# Patient Record
Sex: FEMALE | Race: BLACK OR AFRICAN AMERICAN | Employment: FULL TIME | URBAN - METROPOLITAN AREA
[De-identification: names, ages, dates, MRNs, and addresses within clinical notes are randomized per-mention and may not be internally consistent; named-entity substitution may affect disease eponyms.]

---

## 2017-06-25 ENCOUNTER — HOSPITAL ENCOUNTER (EMERGENCY)
Age: 37
Discharge: HOME | End: 2017-06-25
Attending: STUDENT IN AN ORGANIZED HEALTH CARE EDUCATION/TRAINING PROGRAM
Payer: COMMERCIAL

## 2017-06-25 VITALS
TEMPERATURE: 98.2 F | DIASTOLIC BLOOD PRESSURE: 104 MMHG | HEART RATE: 89 BPM | OXYGEN SATURATION: 99 % | HEIGHT: 66 IN | RESPIRATION RATE: 18 BRPM | BODY MASS INDEX: 35.03 KG/M2 | SYSTOLIC BLOOD PRESSURE: 158 MMHG | WEIGHT: 218 LBS

## 2017-06-25 DIAGNOSIS — K59.00 CONSTIPATION, UNSPECIFIED CONSTIPATION TYPE: Primary | ICD-10-CM

## 2017-06-25 PROCEDURE — 99283 EMERGENCY DEPT VISIT LOW MDM: CPT

## 2017-06-25 RX ORDER — FACIAL-BODY WIPES
10 EACH TOPICAL DAILY
Qty: 12 SUPPOSITORY | Refills: 0 | Status: SHIPPED | OUTPATIENT
Start: 2017-06-25 | End: 2020-01-22

## 2017-06-25 NOTE — ED TRIAGE NOTES
Patient presents to the emergency department reporting constipation. Patient reports having constipation earlier in the week, using a Fleets and having a stool with that on Wednesday. Patient continues to take Colace daily. Patient took two bottles of Magnesium Citrate and attempted to Fleets enemas tonight without having a stool.

## 2017-06-25 NOTE — DISCHARGE INSTRUCTIONS
We hope that we have addressed all of your medical concerns. The examination and treatment you received in the Emergency Department were for an emergent problem and were not intended as complete care. It is important that you follow up with your healthcare provider(s) for ongoing care. If your symptoms worsen or do not improve as expected, and you are unable to reach your usual health care provider(s), you should return to the Emergency Department. Today's healthcare is undergoing tremendous change, and patient satisfaction surveys are one of the many tools to assess the quality of medical care. You may receive a survey from the [employer] organization regarding your experience in the Emergency Department. I hope that your experience has been completely positive, particularly the medical care that I provided. As such, please participate in the survey; anything less than excellent does not meet my expectations or intentions. On license of [facility] and [facility] participate in nationally recognized quality of care measures. If your blood pressure is greater than 120/80, as reported below, we urge that you seek medical care to address the potential of high blood pressure, commonly known as hypertension. Hypertension can be hereditary or can be caused by certain medical conditions, pain, stress, or \"white coat syndrome. \"       Please make an appointment with your health care provider(s) for follow up of your Emergency Department visit. VITALS:   Patient Vitals for the past 8 hrs:   Temp Pulse Resp BP SpO2   06/25/17 0527 98.2 °F (36.8 °C) 89 18 (!) 158/104 99 %          Thank you for allowing us to provide you with medical care today. We realize that you have many choices for your emergency care needs. Please choose us in the future for any continued health care needs. Yevgeniy Carlson, 16 [facility]. Office: [phone]            No results found for this or any previous visit (from the past 24 hour(s)). No results found. Constipation: Care Instructions  Your Care Instructions  Constipation means that you have a hard time passing stools (bowel movements). People pass stools from 3 times a day to once every 3 days. What is normal for you may be different. Constipation may occur with pain in the rectum and cramping. The pain may get worse when you try to pass stools. Sometimes there are small amounts of bright red blood on toilet paper or the surface of stools. This is because of enlarged veins near the rectum (hemorrhoids). A few changes in your diet and lifestyle may help you avoid ongoing constipation. Your doctor may also prescribe medicine to help loosen your stool. Some medicines can cause constipation. These include pain medicines and antidepressants. Tell your doctor about all the medicines you take. Your doctor may want to make a medicine change to ease your symptoms. Follow-up care is a key part of your treatment and safety. Be sure to make and go to all appointments, and call your doctor if you are having problems. It's also a good idea to know your test results and keep a list of the medicines you take. How can you care for yourself at home? · Drink plenty of fluids, enough so that your urine is light yellow or clear like water. If you have kidney, heart, or liver disease and have to limit fluids, talk with your doctor before you increase the amount of fluids you drink. · Include high-fiber foods in your diet each day. These include fruits, vegetables, beans, and whole grains. · Get at least 30 minutes of exercise on most days of the week. Walking is a good choice. You also may want to do other activities, such as running, swimming, cycling, or playing tennis or team sports. · Take a fiber supplement, such as Citrucel or Metamucil, every day.  Read and follow all instructions on the label. · Schedule time each day for a bowel movement. A daily routine may help. Take your time having your bowel movement. · Support your feet with a small step stool when you sit on the toilet. This helps flex your hips and places your pelvis in a squatting position. · Your doctor may recommend an over-the-counter laxative to relieve your constipation. Examples are Milk of Magnesia and MiraLax. Read and follow all instructions on the label. Do not use laxatives on a long-term basis. When should you call for help? Call your doctor now or seek immediate medical care if:  · You have new or worse belly pain. · You have new or worse nausea or vomiting. · You have blood in your stools. Watch closely for changes in your health, and be sure to contact your doctor if:  · Your constipation is getting worse. · You do not get better as expected. Where can you learn more? Go to http://aldo-karen.info/. Enter 21 [phone] in the search box to learn more about \"Constipation: Care Instructions. \"  Current as of: March 20, 2017  Content Version: 11.3  © 7312-4772 [email or web address]. Care instructions adapted under license by [employer] (which disclaims liability or warranty for this information). If you have questions about a medical condition or this instruction, always ask your healthcare professional. [address] any warranty or liability for your use of this information.

## 2017-06-25 NOTE — ED NOTES
MD reviewed discharge instructions and options with patient; patient verbalized understanding. RN reviewed discharge instructions using teachback method. Pt. Ambulated to exit without difficulty and in no signs of acute distress. Patient was counseled on medications prescribed at discharge.

## 2017-06-25 NOTE — ED PROVIDER NOTES
HPI Comments: Patient is a 48-year-old female with no significant past medical history who presents to the emergency department this morning with a chief complaint of constipation for the past 4 days. Patient states she has been unable to have bowel movement since Wednesday. It is associated with mild lower abdominal cramping. She is recently changed her diet and is eating more fruits and vegetables and has been exercising more, however, this is cause her to have more frequent constipation. She is she used mag citrate, fleets enemas, and Colace at home with only minimal relief. She denies fever, vomiting, back pain, dysuria, rectal pain. Patient states she is suffered from constipation off and on over the past several years, but it has not been this bad since the birth of her first child. Patient has an IUD and does not get regular periods. The history is provided by the patient. No  was used. 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  2006    HX GYN      left fallopian tube removal    HX OTHER SURGICAL  2002    lap for ectopic, L fallop.  tube removed    HX OTHER SURGICAL  2014    oral surgery, teeth extracted    HX OTHER SURGICAL  2014    surgery under L arm    HX OTHER SURGICAL  2013    L knee surgery, torn meniscus    HX OTHER SURGICAL  1984    T&A         Family History:   Problem Relation Age of Onset    Cancer Mother     Arthritis-osteo Mother     Diabetes Mother     Hypertension Mother     Cancer Father     Heart Failure Father     Arthritis-osteo Maternal Grandmother     Cancer Maternal Grandmother     Elevated Lipids Maternal Grandmother     Hypertension Maternal Grandmother     Stroke Maternal Grandmother     Arthritis-osteo Maternal Grandfather     Cancer Maternal Grandfather     No Known Problems Sister        Social History     Social History    Marital status:      Spouse name: N/A    Number of children: N/A    Years of education: N/A     Occupational History    Not on file. Social History Main Topics    Smoking status: Never Smoker    Smokeless tobacco: Never Used    Alcohol use No      Comment: social    Drug use: No    Sexual activity: No     Other Topics Concern    Not on file     Social History Narrative         ALLERGIES: Review of patient's allergies indicates no known allergies. Review of Systems   Constitutional: Negative for chills and fever. HENT: Negative for sore throat. Respiratory: Negative for cough and shortness of breath. Cardiovascular: Negative for chest pain. Gastrointestinal: Positive for constipation. Negative for abdominal distention, abdominal pain, anal bleeding, nausea, rectal pain and vomiting. Genitourinary: Negative for dysuria. Musculoskeletal: Negative for back pain. Skin: Negative for rash. Neurological: Negative for syncope and headaches. Psychiatric/Behavioral: Negative for confusion. All other systems reviewed and are negative. Vitals:    06/25/17 0527   BP: (!) 158/104   Pulse: 89   Resp: 18   Temp: 98.2 °F (36.8 °C)   SpO2: 99%   Weight: 98.9 kg (218 lb)   Height: 5' 6\" (1.676 m)            Physical Exam   Constitutional: She is oriented to person, place, and time. She appears well-developed. No distress. HENT:   Head: Normocephalic and atraumatic. Eyes: Conjunctivae and EOM are normal. Pupils are equal, round, and reactive to light. Neck: Normal range of motion. Neck supple. Cardiovascular: Normal rate, regular rhythm and normal heart sounds. No murmur heard. Pulmonary/Chest: Effort normal and breath sounds normal. No respiratory distress. Abdominal: Soft.  Bowel sounds are normal. She exhibits no distension. There is no tenderness. There is no rebound and no guarding. Musculoskeletal: Normal range of motion. She exhibits no edema. Neurological: She is alert and oriented to person, place, and time. No cranial nerve deficit. She exhibits normal muscle tone. Coordination normal.   Skin: Skin is warm and dry. No rash noted. Psychiatric: She has a normal mood and affect. Her behavior is normal.   Nursing note and vitals reviewed.        [facility]  ED Course       Procedures

## 2017-12-20 ENCOUNTER — HOSPITAL ENCOUNTER (EMERGENCY)
Age: 37
Discharge: HOME | End: 2017-12-20
Attending: EMERGENCY MEDICINE
Payer: COMMERCIAL

## 2017-12-20 ENCOUNTER — APPOINTMENT (OUTPATIENT)
Dept: GENERAL RADIOLOGY | Age: 37
End: 2017-12-20
Attending: EMERGENCY MEDICINE
Payer: COMMERCIAL

## 2017-12-20 VITALS
SYSTOLIC BLOOD PRESSURE: 122 MMHG | RESPIRATION RATE: 16 BRPM | HEART RATE: 101 BPM | BODY MASS INDEX: 35.1 KG/M2 | HEIGHT: 66 IN | TEMPERATURE: 99.8 F | WEIGHT: 218.38 LBS | DIASTOLIC BLOOD PRESSURE: 84 MMHG | OXYGEN SATURATION: 97 %

## 2017-12-20 DIAGNOSIS — R68.89 FLU-LIKE SYMPTOMS: Primary | ICD-10-CM

## 2017-12-20 LAB
FLUAV AG NPH QL IA: NEGATIVE
FLUBV AG NOSE QL IA: NEGATIVE
HCG UR QL: NEGATIVE

## 2017-12-20 PROCEDURE — 77030029684 HC NEB SM VOL KT MONA -A

## 2017-12-20 PROCEDURE — 96361 HYDRATE IV INFUSION ADD-ON: CPT

## 2017-12-20 PROCEDURE — 74011250636 HC RX REV CODE- 250/636: Performed by: EMERGENCY MEDICINE

## 2017-12-20 PROCEDURE — 94664 DEMO&/EVAL PT USE INHALER: CPT

## 2017-12-20 PROCEDURE — 99284 EMERGENCY DEPT VISIT MOD MDM: CPT

## 2017-12-20 PROCEDURE — 81025 URINE PREGNANCY TEST: CPT

## 2017-12-20 PROCEDURE — 96374 THER/PROPH/DIAG INJ IV PUSH: CPT

## 2017-12-20 PROCEDURE — 94640 AIRWAY INHALATION TREATMENT: CPT

## 2017-12-20 PROCEDURE — 87804 INFLUENZA ASSAY W/OPTIC: CPT | Performed by: EMERGENCY MEDICINE

## 2017-12-20 PROCEDURE — 74011250637 HC RX REV CODE- 250/637: Performed by: EMERGENCY MEDICINE

## 2017-12-20 PROCEDURE — 71020 XR CHEST PA LAT: CPT

## 2017-12-20 RX ORDER — KETOROLAC TROMETHAMINE 30 MG/ML
30 INJECTION, SOLUTION INTRAMUSCULAR; INTRAVENOUS
Status: COMPLETED | OUTPATIENT
Start: 2017-12-20 | End: 2017-12-20

## 2017-12-20 RX ORDER — PREDNISONE 20 MG/1
40 TABLET ORAL DAILY
Qty: 10 TAB | Refills: 0 | Status: SHIPPED | OUTPATIENT
Start: 2017-12-20 | End: 2017-12-25

## 2017-12-20 RX ORDER — GUAIFENESIN 600 MG/1
600 TABLET, EXTENDED RELEASE ORAL
Status: COMPLETED | OUTPATIENT
Start: 2017-12-20 | End: 2017-12-20

## 2017-12-20 RX ORDER — IBUPROFEN 600 MG/1
600 TABLET ORAL
Qty: 20 TAB | Refills: 0 | Status: SHIPPED | OUTPATIENT
Start: 2017-12-20 | End: 2020-01-22

## 2017-12-20 RX ORDER — GUAIFENESIN 600 MG/1
600 TABLET, EXTENDED RELEASE ORAL 2 TIMES DAILY
Qty: 20 TAB | Refills: 0 | Status: SHIPPED | OUTPATIENT
Start: 2017-12-20 | End: 2020-01-22

## 2017-12-20 RX ORDER — ALBUTEROL SULFATE 90 UG/1
2 AEROSOL, METERED RESPIRATORY (INHALATION)
Status: DISCONTINUED | OUTPATIENT
Start: 2017-12-20 | End: 2017-12-20 | Stop reason: HOSPADM

## 2017-12-20 RX ADMIN — SODIUM CHLORIDE 1000 ML: 900 INJECTION, SOLUTION INTRAVENOUS at 14:18

## 2017-12-20 RX ADMIN — KETOROLAC TROMETHAMINE 30 MG: 30 INJECTION, SOLUTION INTRAMUSCULAR at 14:18

## 2017-12-20 RX ADMIN — ALBUTEROL SULFATE 2 PUFF: 90 AEROSOL, METERED RESPIRATORY (INHALATION) at 15:17

## 2017-12-20 RX ADMIN — GUAIFENESIN 600 MG: 600 TABLET, EXTENDED RELEASE ORAL at 14:40

## 2017-12-20 NOTE — DISCHARGE INSTRUCTIONS
We hope that we have addressed all of your medical concerns. The examination and treatment you received in the Emergency Department were for an emergent problem and were not intended as complete care. It is important that you follow up with your healthcare provider(s) for ongoing care. If your symptoms worsen or do not improve as expected, and you are unable to reach your usual health care provider(s), you should return to the Emergency Department. Today's healthcare is undergoing tremendous change, and patient satisfaction surveys are one of the many tools to assess the quality of medical care. You may receive a survey from the [employer] regarding your experience in the Emergency Department. I hope that your experience has been completely positive, particularly the medical care that I provided. As such, please participate in the survey; anything less than excellent does not meet my expectations or intentions. [facility] and [address] [place] [address] participate in nationally recognized quality of care measures. If your blood pressure is greater than 120/80, as reported below, we urge that you seek medical care to address the potential of high blood pressure, commonly known as hypertension. Hypertension can be hereditary or can be caused by certain medical conditions, pain, stress, or \"white coat syndrome. \"       Please make an appointment with your health care provider(s) for follow up of your Emergency Department visit. VITALS:   Patient Vitals for the past 8 hrs:   Temp Pulse Resp BP SpO2   12/20/17 1355 (!) 101.4 °F (38.6 °C) (!) 117 18 133/90 96 %          Thank you for allowing us to provide you with medical care today. We realize that you have many choices for your emergency care needs. Please choose us in the future for any continued health care needs.       Prasad Draper MD    [facility], 5 [facility]. Office: [phone]            Recent Results (from the past 24 hour(s))   INFLUENZA A & B AG (RAPID TEST)    Collection Time: 12/20/17  2:03 PM   Result Value Ref Range    Influenza A Antigen NEGATIVE  NEG      Influenza B Antigen NEGATIVE  NEG     HCG URINE, QL. - POC    Collection Time: 12/20/17  2:47 PM   Result Value Ref Range    Pregnancy test,urine (POC) NEGATIVE  NEG         No results found.

## 2017-12-20 NOTE — ED TRIAGE NOTES
Pt states that she was started on a z-pack Monday for bronchitis, pt states \"i think I have the flu, cant keep my temp down and I have the chills\". Pt is coughing in triage. Pt states that the symptoms started Sunday.

## 2017-12-20 NOTE — LETTER
[address] 
[address] [facility] [id number] 
[phone] Work/School Note Date: 12/20/2017 To Whom It May concern: 
 
Nelda Alicea was seen and treated today in the emergency room by the following provider(s): 
Attending Provider: Jodie Hurst MD.   
 
Nelda Alicea may return to work on 12/26/2017.  
 
Sincerely, 
 
 
 
 
Jodie Hurst MD

## 2017-12-20 NOTE — ED PROVIDER NOTES
HPI Comments: 39 y.o. female with past medical history significant for ectopic pregnancy, epilepsy, anemia, and C section who presents from home with chief complaint of myalgias. The pt reports that she started to feel weak 3 days ago. 2 days ago, she started to have chills, fever,  myalgias, sore throat, and rhinorrhea. The pt went to a clinic, had a negative flu swab, was dx with bronchitis and was started on a Z-Pack. The has also been taking tylenol and motrin as well. The pt denies vomiting and diarrhea. There are no other acute medical concerns at this time. Social hx: nonsmoker, no EtOH use  PCP: PROVIDER UNKNOWN    Note written by Fulton Hodgkin, Scribe, as dictated by Deangelo Simms MD 2:18 PM      The history is provided by the patient. No  was used. 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  tube removed    HX OTHER SURGICAL  2014    oral surgery, teeth extracted    HX OTHER SURGICAL  2014    surgery under L arm    HX OTHER SURGICAL  2013    L knee surgery, torn meniscus    HX OTHER SURGICAL  1984    T&A         Family History:   Problem Relation Age of Onset    Cancer Mother     Arthritis-osteo Mother     Diabetes Mother     Hypertension Mother     Cancer Father     Heart Failure Father     Arthritis-osteo Maternal Grandmother     Cancer Maternal Grandmother     Elevated Lipids Maternal Grandmother     Hypertension Maternal Grandmother     Stroke Maternal Grandmother     Arthritis-osteo Maternal Grandfather     Cancer Maternal Grandfather     No Known Problems Sister Social History     Social History    Marital status:      Spouse name: N/A    Number of children: N/A    Years of education: N/A     Occupational History    Not on file. Social History Main Topics    Smoking status: Never Smoker    Smokeless tobacco: Never Used    Alcohol use No      Comment: social    Drug use: No    Sexual activity: No     Other Topics Concern    Not on file     Social History Narrative         ALLERGIES: Review of patient's allergies indicates no known allergies. Review of Systems   Constitutional: Positive for chills and fever. HENT: Positive for rhinorrhea and sore throat. Negative for facial swelling. Eyes: Negative for visual disturbance. Respiratory: Negative for chest tightness. Cardiovascular: Negative for chest pain. Gastrointestinal: Negative for abdominal pain, diarrhea, nausea and vomiting. Genitourinary: Negative for dysuria. Musculoskeletal: Positive for myalgias. Negative for arthralgias. Skin: Negative for rash. Neurological: Positive for weakness. Negative for dizziness. Hematological: Negative for adenopathy. Psychiatric/Behavioral: Negative for suicidal ideas. All other systems reviewed and are negative. Vitals:    12/20/17 1355   BP: 133/90   Pulse: (!) 117   Resp: 18   Temp: (!) 101.4 °F (38.6 °C)   SpO2: 96%   Weight: 99.1 kg (218 lb 6 oz)   Height: 5' 6\" (1.676 m)            Physical Exam   Constitutional: She is oriented to person, place, and time. She appears well-developed and well-nourished. No distress. HENT:   Head: Normocephalic and atraumatic. Mouth/Throat: Oropharynx is clear and moist.   Eyes: Pupils are equal, round, and reactive to light. No scleral icterus. Neck: Normal range of motion. Neck supple. No thyromegaly present. Cardiovascular: Regular rhythm, normal heart sounds and intact distal pulses. No murmur heard.   Tachycardic;     Pulmonary/Chest: Effort normal and breath sounds normal. No respiratory distress. Abdominal: Soft. Bowel sounds are normal. She exhibits no distension. There is no tenderness. Musculoskeletal: Normal range of motion. She exhibits no edema. Neurological: She is alert and oriented to person, place, and time. Skin: Skin is warm and dry. No rash noted. She is not diaphoretic. Nursing note and vitals reviewed. Note written by Argenis Cameron, as dictated by Urmila Ortiz MD 2:19 PM    [facility]  ED Course       Procedures    SUBJECTIVE:   Malinda Alfonso is a 39 y.o. female who present complaining of flu-like symptoms: fevers, chills, myalgias, congestion, sore throat and cough for 3 days. Denies dyspnea or wheezing. OBJECTIVE:  Appears moderately ill but not toxic; temperature as noted in vitals. Ears normal. Throat and pharynx normal.  Neck supple. No adenopathy in the neck. Sinuses non tender. The chest is clear. Rapid flu neg. CXR unremarkable. ASSESSMENT:  Influenza or virla illness. PLAN:  Symptomatic therapy suggested: rest, increase fluids, albuterol and prednisone for bronchiole cough. Call prn if symptoms persist or worsen. Call or return to clinic prn if these symptoms worsen or fail to improve as anticipated.

## 2020-01-22 ENCOUNTER — OFFICE VISIT (OUTPATIENT)
Dept: FAMILY MEDICINE CLINIC | Age: 40
End: 2020-01-22

## 2020-01-22 VITALS
TEMPERATURE: 98.3 F | RESPIRATION RATE: 17 BRPM | HEIGHT: 66 IN | DIASTOLIC BLOOD PRESSURE: 91 MMHG | OXYGEN SATURATION: 97 % | HEART RATE: 89 BPM | WEIGHT: 219 LBS | SYSTOLIC BLOOD PRESSURE: 142 MMHG | BODY MASS INDEX: 35.2 KG/M2

## 2020-01-22 DIAGNOSIS — G62.9 NEUROPATHY: ICD-10-CM

## 2020-01-22 DIAGNOSIS — Z13.220 SCREENING FOR HYPERLIPIDEMIA: ICD-10-CM

## 2020-01-22 DIAGNOSIS — R53.82 CHRONIC FATIGUE: ICD-10-CM

## 2020-01-22 DIAGNOSIS — R06.83 SNORING: ICD-10-CM

## 2020-01-22 DIAGNOSIS — I10 ESSENTIAL HYPERTENSION: Primary | ICD-10-CM

## 2020-01-22 DIAGNOSIS — E66.01 SEVERE OBESITY (HCC): ICD-10-CM

## 2020-01-22 DIAGNOSIS — M79.604 RIGHT LEG PAIN: ICD-10-CM

## 2020-01-22 RX ORDER — AMLODIPINE BESYLATE 5 MG/1
TABLET ORAL
COMMUNITY
End: 2020-01-23 | Stop reason: SDUPTHER

## 2020-01-22 NOTE — PROGRESS NOTES
Chief Complaint   Patient presents with   [facility]    Leg Pain     right      1. Have you been to the ER, urgent care clinic since your last visit? Hospitalized since your last visit? No    2. Have you seen or consulted any other health care providers outside of the [address] [place] [address] since your last visit? Include any pap smears or colon screening.  No

## 2020-01-22 NOTE — PROGRESS NOTES
Tegan Hoffmanveronica Rod  [address] y.o. female  1980  [address]  [id number]     [facility]       Encounter Date: 1/22/2020           New Patient Visit Note: Aimee Ramírez MD    Previous PCP: Urgent Care    Reason for Appointment:  Chief Complaint   Patient presents with   [place] [facility]    Leg Pain     right        History of Present Illness:  History provided by patient    Karina Landon is a [address] y.[address] 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 Denies any chronic neck pain. Does endorse chronic low back pain.          Chronic pelvic pain in female     Followed by obgyn      BV (bacterial vaginosis)     Patient reports having chronic BV  She is followed by OBGYN      Hydradenitis     Followed by Surgery (Dr. Brittney Arguello)      Essential hypertension     Duration: since Jan, 2017  Inciting Factors; reports that her blood pressure has been elevated ever since haivng her baby  Medicines: Norvasc, 5mg daily  Diet: adhering to a low salt diet (avoid beef, pork)  Exercise: stays active during the day with work; goes to the gym occasionally  Snoring: yes, never evaluated for PIERRE  Tb: no      Severe obesity ([address]     Patient reports that she has tried a number of diets in the past, but she reports that it is difficulty because she is usually very busy. She does reports that she stays active at work. Has Children ages 22,13,3      Other Conditions  Has chronic pelvic pain and BV followed by OBGYN  Has HS (followed by surgery- Mercedes)  Hx of anxeity (no recent attacks)      Review of Systems  Denies any dyspnea or CP. All other ROS were reviewed and are negative except as discussed in HPI    Allergies: Patient has no known allergies. Medications: (Updated to reflect final medication list after visit)    Current Outpatient Medications:     amLODIPine (NORVASC) 5 mg tablet, Take 2 Tabs by mouth daily. , Disp: 1 Tab, Rfl: 0    B-complex with vitamin C (SUPER B COMPLEX-VITAMIN C PO), Super B Complex-Vitamin C, Disp: , Rfl:     multivit-min-ferrous fumarate (MULTI VITAMIN) 9 mg iron/15 mL liqd, Multi Vitamin, Disp: , Rfl:     levonorgestreL (MIRENA) 20 mcg/24 hours (5 yrs) 52 mg IUD, Mirena 20 mcg/24 hours (5 yrs) 52 mg intrauterine device  Take by intrauterine route., Disp: , Rfl:     History  Patient Care Team:  Florence Herrera MD as PCP - General (Family Practice)  Florence Herrera MD as PCP - [facility] Provider  Bimal Saldaña MD as Physician (Internal Medicine)    Past Medical History: she has a past medical history of Anemia (), Diet controlled gestational diabetes mellitus (GDM) in third trimester (2016), Ectopic pregnancy, Epilepsy ([address] and Gestational diabetes (2016). Past Surgical History: she has a past surgical history that includes hx gyn; pr  delivery only (); pr  delivery only (); hx other surgical (); hx other surgical (); hx other surgical (); hx other surgical (); and hx other surgical ().     Family Medical History: family history includes Arthritis-osteo in her maternal grandfather, maternal grandmother, and mother; Cancer in her father, maternal grandfather, and maternal grandmother; Diabetes in her mother; Elevated Lipids in her maternal grandmother; Heart Failure in her father; Hypertension in her maternal grandmother and mother; No Known Problems in her sister; Stroke in her maternal grandmother. Social History: she reports that she has never smoked. She has never used smokeless tobacco. She reports current alcohol use. She reports that she does not use drugs. Health Maintenance Due   Topic Date Due    DTaP/Tdap/Td series (1 - Tdap) 12/29/1990    PAP AKA CERVICAL CYTOLOGY  12/29/2000    Influenza Age 9 to Adult  08/01/2019       Objective:   Visit Vitals  BP (!) 142/91   Pulse 89   Temp 98.3 °F (36.8 °C) (Oral)   Resp 17   Ht 5' 6\" (1.676 m)   Wt 219 lb (99.3 kg)   SpO2 97%   BMI 35.35 kg/m²     Wt Readings from Last 3 Encounters:   01/22/20 219 lb (99.3 kg)   12/20/17 218 lb 6 oz (99.1 kg)   06/25/17 218 lb (98.9 kg)       Physical Exam  Vitals signs reviewed. HENT:      Head: Normocephalic and atraumatic. Right Ear: Hearing normal. No drainage. No middle ear effusion. There is no impacted cerumen. Tympanic membrane is not injected or erythematous. Left Ear: Hearing normal. No drainage. No middle ear effusion. There is no impacted cerumen. Tympanic membrane is not injected or erythematous. Nose: Nose normal. No nasal deformity or septal deviation. Mouth/Throat:      Lips: Pink. No lesions. Mouth: Mucous membranes are moist.      Palate: No mass. Pharynx: Oropharynx is clear. Uvula midline. No pharyngeal swelling, oropharyngeal exudate or posterior oropharyngeal erythema. Tonsils: No tonsillar exudate. Eyes:      General: Lids are normal. Vision grossly intact. Gaze aligned appropriately. Extraocular Movements: Extraocular movements intact. Conjunctiva/sclera:      Right eye: Right conjunctiva is not injected. Left eye: Left conjunctiva is not injected. Pupils: Pupils are equal, round, and reactive to light. Neck:      Musculoskeletal: Normal range of motion and neck supple. No neck rigidity or muscular tenderness. Vascular: No carotid bruit. Cardiovascular:      Rate and Rhythm: Normal rate and regular rhythm. Heart sounds: No murmur. No friction rub. No gallop. Pulmonary:      Effort: No respiratory distress. Breath sounds: No wheezing, rhonchi or rales. Abdominal:      General: Bowel sounds are normal. There is no distension or abdominal bruit. Tenderness: There is no tenderness. Musculoskeletal: Normal range of motion. Cervical back: Normal.      Comments: Lower Extremity Circumference  40.5 at 30cm (left leg)  41.0 cm at 30cm (right leg)   Lymphadenopathy:      Cervical: No cervical adenopathy. Neurological:      General: No focal deficit present. Motor: Motor function is intact. Coordination: Coordination is intact. Gait: Gait is intact. Deep Tendon Reflexes:      Reflex Scores:       Patellar reflexes are 2+ on the right side and 2+ on the left side. Psychiatric:         Attention and Perception: Attention and perception normal.         Mood and Affect: Mood and affect normal.         Speech: Speech normal.         Cognition and Memory: Cognition and memory normal.          Assessment & Plan:    Diagnoses and all orders for this visit:    1. Essential hypertension  Assessment & Plan:  Chronic, BP elevated today  - increase Norvasc from 5mg to 10mg daily  - continue to monitor BP at home  - RTC 2 weeks for BP check    Orders:  -     amLODIPine (NORVASC) 5 mg tablet; Take 2 Tabs by mouth daily. 2. Severe obesity ([address]  Assessment & Plan:  Chronic: I have reviewed/discussed the above normal BMI with the patient.   I have recommended the following interventions: dietary management education, guidance, and counseling, encourage exercise, monitor weight and prescribed dietary intake. 3. Snoring  Assessment & Plan:  Chronic  - will evaluate for sleep apnea with sleep study    Orders:  -     REFERRAL TO SLEEP STUDIES    4. Right leg pain  Assessment & Plan:  Chronic, unclear etiology, no significant swelling on exam today but given patient's expressed concern for DVT will order ultrasound to evaluate further  - obtain US of right LE  - discussed red flag symptoms and reasons to call or go to ED    Orders:  -     DUPLEX LOWER EXT VENOUS RIGHT; Future    5. Neuropathy  Assessment & Plan:  Chronic, unclear etiology for her symptoms  - obtain lumbar xray given hx of neuropathy in setting of chronic low back pain  - consider referral to neurology if xray unrevealing     Orders:  -     VITAMIN B12 & FOLATE    6. Chronic fatigue  -     TSH 3RD GENERATION  -     CBC WITH AUTOMATED DIFF  -     VITAMIN D, 25 HYDROXY    7. Screening for hyperlipidemia  -     METABOLIC PANEL, COMPREHENSIVE  -     LIPID PANEL      I have discussed the diagnosis with the patient and the intended plan as seen in the above orders. The patient has received an after-visit summary along with patient information handout. I have discussed medication side effects and warnings with the patient as well. Dispostion  Follow-up and Dispositions    · Return in about 2 weeks (around 2/5/2020) for neuropathy.            Inderjit Hutchinson MD

## 2020-01-22 NOTE — PATIENT INSTRUCTIONS
DASH Diet: Care Instructions Your Care Instructions The DASH diet is an eating plan that can help lower your blood pressure. DASH stands for Dietary Approaches to Stop Hypertension. Hypertension is high blood pressure. The DASH diet focuses on eating foods that are high in calcium, potassium, and magnesium. These nutrients can lower blood pressure. The foods that are highest in these nutrients are fruits, vegetables, low-fat dairy products, nuts, seeds, and legumes. But taking calcium, potassium, and magnesium supplements instead of eating foods that are high in those nutrients does not have the same effect. The DASH diet also includes whole grains, fish, and poultry. The DASH diet is one of several lifestyle changes your doctor may recommend to lower your high blood pressure. Your doctor may also want you to decrease the amount of sodium in your diet. Lowering sodium while following the DASH diet can lower blood pressure even further than just the DASH diet alone. Follow-up care is a key part of your treatment and safety. Be sure to make and go to all appointments, and call your doctor if you are having problems. It's also a good idea to know your test results and keep a list of the medicines you take. How can you care for yourself at home? Following the DASH diet · Eat 4 to 5 servings of fruit each day. A serving is 1 medium-sized piece of fruit, ½ cup chopped or canned fruit, 1/4 cup dried fruit, or 4 ounces (½ cup) of fruit juice. Choose fruit more often than fruit juice. · Eat 4 to 5 servings of vegetables each day. A serving is 1 cup of lettuce or raw leafy vegetables, ½ cup of chopped or cooked vegetables, or 4 ounces (½ cup) of vegetable juice. Choose vegetables more often than vegetable juice. · Get 2 to 3 servings of low-fat and fat-free dairy each day. A serving is 8 ounces of milk, 1 cup of yogurt, or 1 ½ ounces of cheese. · Eat 6 to 8 servings of grains each day. A serving is 1 slice of bread, 1 ounce of dry cereal, or ½ cup of cooked rice, pasta, or cooked cereal. Try to choose whole-grain products as much as possible. · Limit lean meat, poultry, and fish to 2 servings each day. A serving is 3 ounces, about the size of a deck of cards. · Eat 4 to 5 servings of nuts, seeds, and legumes (cooked dried beans, lentils, and split peas) each week. A serving is 1/3 cup of nuts, 2 tablespoons of seeds, or ½ cup of cooked beans or peas. · Limit fats and oils to 2 to 3 servings each day. A serving is 1 teaspoon of vegetable oil or 2 tablespoons of salad dressing. · Limit sweets and added sugars to 5 servings or less a week. A serving is 1 tablespoon jelly or jam, ½ cup sorbet, or 1 cup of lemonade. · Eat less than 2,300 milligrams (mg) of sodium a day. If you limit your sodium to 1,500 mg a day, you can lower your blood pressure even more. Tips for success · Start small. Do not try to make dramatic changes to your diet all at once. You might feel that you are missing out on your favorite foods and then be more likely to not follow the plan. Make small changes, and stick with them. Once those changes become habit, add a few more changes. · Try some of the following: ? Make it a goal to eat a fruit or vegetable at every meal and at snacks. This will make it easy to get the recommended amount of fruits and vegetables each day. ? Try yogurt topped with fruit and nuts for a snack or healthy dessert. ? Add lettuce, tomato, cucumber, and onion to sandwiches. ? Combine a ready-made pizza crust with low-fat mozzarella cheese and lots of vegetable toppings. Try using tomatoes, squash, spinach, broccoli, carrots, cauliflower, and onions. ? Have a variety of cut-up vegetables with a low-fat dip as an appetizer instead of chips and dip. ? Sprinkle sunflower seeds or chopped almonds over salads.  Or try adding chopped walnuts or almonds to cooked vegetables. ? Try some vegetarian meals using beans and peas. Add garbanzo or kidney beans to salads. Make burritos and tacos with mashed randall beans or black beans. Where can you learn more? Go to http://aldo-karen.info/. Enter T471 in the search box to learn more about \"DASH Diet: Care Instructions. \" Current as of: April 9, 2019 Content Version: 12.2 © 9836-8690 [employer]. Care instructions adapted under license by [employer] (which disclaims liability or warranty for this information). If you have questions about a medical condition or this instruction, always ask your healthcare professional. [address] any warranty or liability for your use of this information.

## 2020-01-23 PROBLEM — I10 ESSENTIAL HYPERTENSION: Status: ACTIVE | Noted: 2020-01-23

## 2020-01-23 PROBLEM — R10.2 CHRONIC PELVIC PAIN IN FEMALE: Status: ACTIVE | Noted: 2020-01-23

## 2020-01-23 PROBLEM — R06.83 SNORING: Status: ACTIVE | Noted: 2020-01-23

## 2020-01-23 PROBLEM — B96.89 BV (BACTERIAL VAGINOSIS): Chronic | Status: ACTIVE | Noted: 2020-01-23

## 2020-01-23 PROBLEM — F41.9 ANXIETY: Status: ACTIVE | Noted: 2020-01-23

## 2020-01-23 PROBLEM — L73.2 HYDRADENITIS: Status: ACTIVE | Noted: 2020-01-23

## 2020-01-23 PROBLEM — G89.29 CHRONIC PELVIC PAIN IN FEMALE: Status: ACTIVE | Noted: 2020-01-23

## 2020-01-23 PROBLEM — G62.9 NEUROPATHY: Status: ACTIVE | Noted: 2020-01-23

## 2020-01-23 PROBLEM — M79.604 RIGHT LEG PAIN: Status: ACTIVE | Noted: 2020-01-23

## 2020-01-23 PROBLEM — N76.0 BV (BACTERIAL VAGINOSIS): Chronic | Status: ACTIVE | Noted: 2020-01-23

## 2020-01-23 RX ORDER — AMLODIPINE BESYLATE 5 MG/1
10 TABLET ORAL DAILY
Qty: 1 TAB | Refills: 0
Start: 2020-01-23 | End: 2021-10-05

## 2020-01-24 LAB
25(OH)D3+25(OH)D2 SERPL-MCNC: 8.8 NG/ML (ref 30–100)
ALBUMIN SERPL-MCNC: 4.4 G/DL (ref 3.8–4.8)
ALBUMIN/GLOB SERPL: 1.3 {RATIO} (ref 1.2–2.2)
ALP SERPL-CCNC: 93 IU/L (ref 39–117)
ALT SERPL-CCNC: 9 IU/L (ref 0–32)
AST SERPL-CCNC: 12 IU/L (ref 0–40)
BASOPHILS # BLD AUTO: 0 X10E3/UL (ref 0–0.2)
BASOPHILS NFR BLD AUTO: 1 %
BILIRUB SERPL-MCNC: 0.2 MG/DL (ref 0–1.2)
BUN SERPL-MCNC: 17 MG/DL (ref 6–24)
BUN/CREAT SERPL: 21 (ref 9–23)
CALCIUM SERPL-MCNC: 9.5 MG/DL (ref 8.7–10.2)
CHLORIDE SERPL-SCNC: 103 MMOL/L (ref 96–106)
CHOLEST SERPL-MCNC: 208 MG/DL (ref 100–199)
CO2 SERPL-SCNC: 22 MMOL/L (ref 20–29)
CREAT SERPL-MCNC: 0.81 MG/DL (ref 0.57–1)
EOSINOPHIL # BLD AUTO: 0.2 X10E3/UL (ref 0–0.4)
EOSINOPHIL NFR BLD AUTO: 3 %
ERYTHROCYTE [DISTWIDTH] IN BLOOD BY AUTOMATED COUNT: 12.8 % (ref 11.7–15.4)
FOLATE SERPL-MCNC: 8.8 NG/ML
GLOBULIN SER CALC-MCNC: 3.4 G/DL (ref 1.5–4.5)
GLUCOSE SERPL-MCNC: 101 MG/DL (ref 65–99)
HCT VFR BLD AUTO: 38.9 % (ref 34–46.6)
HDLC SERPL-MCNC: 50 MG/DL
HGB BLD-MCNC: 13.3 G/DL (ref 11.1–15.9)
IMM GRANULOCYTES # BLD AUTO: 0 X10E3/UL (ref 0–0.1)
IMM GRANULOCYTES NFR BLD AUTO: 0 %
INTERPRETATION, 910389: NORMAL
LDLC SERPL CALC-MCNC: 119 MG/DL (ref 0–99)
LYMPHOCYTES # BLD AUTO: 3 X10E3/UL (ref 0.7–3.1)
LYMPHOCYTES NFR BLD AUTO: 40 %
MCH RBC QN AUTO: 28.9 PG (ref 26.6–33)
MCHC RBC AUTO-ENTMCNC: 34.2 G/DL (ref 31.5–35.7)
MCV RBC AUTO: 85 FL (ref 79–97)
MONOCYTES # BLD AUTO: 0.6 X10E3/UL (ref 0.1–0.9)
MONOCYTES NFR BLD AUTO: 8 %
NEUTROPHILS # BLD AUTO: 3.8 X10E3/UL (ref 1.4–7)
NEUTROPHILS NFR BLD AUTO: 48 %
PLATELET # BLD AUTO: 187 X10E3/UL (ref 150–450)
POTASSIUM SERPL-SCNC: 4.3 MMOL/L (ref 3.5–5.2)
PROT SERPL-MCNC: 7.8 G/DL (ref 6–8.5)
RBC # BLD AUTO: 4.6 X10E6/UL (ref 3.77–5.28)
SODIUM SERPL-SCNC: 143 MMOL/L (ref 134–144)
TRIGL SERPL-MCNC: 193 MG/DL (ref 0–149)
TSH SERPL DL<=0.005 MIU/L-ACNC: 0.9 UIU/ML (ref 0.45–4.5)
VIT B12 SERPL-MCNC: 378 PG/ML (ref 232–1245)
VLDLC SERPL CALC-MCNC: 39 MG/DL (ref 5–40)
WBC # BLD AUTO: 7.7 X10E3/UL (ref 3.4–10.8)

## 2020-01-24 NOTE — ASSESSMENT & PLAN NOTE
Chronic, BP elevated today  - increase Norvasc from 5mg to 10mg daily  - continue to monitor BP at home  - RTC 2 weeks for BP check

## 2020-01-24 NOTE — ASSESSMENT & PLAN NOTE
Chronic, unclear etiology for her symptoms  - obtain lumbar xray given hx of neuropathy in setting of chronic low back pain  - consider referral to neurology if xray unrevealing

## 2020-01-24 NOTE — ASSESSMENT & PLAN NOTE
Chronic, unclear etiology, no significant swelling on exam today but given patient's expressed concern for DVT will order ultrasound to evaluate further  - obtain US of right LE  - discussed red flag symptoms and reasons to call or go to ED

## 2020-01-24 NOTE — ASSESSMENT & PLAN NOTE
Chronic: I have reviewed/discussed the above normal BMI with the patient. I have recommended the following interventions: dietary management education, guidance, and counseling, encourage exercise, monitor weight and prescribed dietary intake.

## 2020-02-05 ENCOUNTER — TELEPHONE (OUTPATIENT)
Dept: FAMILY MEDICINE CLINIC | Age: 40
End: 2020-02-05

## 2020-02-05 DIAGNOSIS — E55.9 VITAMIN D DEFICIENCY: Primary | ICD-10-CM

## 2020-02-05 RX ORDER — ERGOCALCIFEROL 1.25 MG/1
50000 CAPSULE ORAL
Qty: 12 CAP | Refills: 0 | Status: SHIPPED | OUTPATIENT
Start: 2020-02-05 | End: 2021-10-05

## 2020-02-05 NOTE — PROGRESS NOTES
Attempted to call patient regarding recent lab results. No answer. Left VM. Please call patient to let her know that her Vitamin d is low. I have sent a supplement for this to her pharmacy, and we will plan to recheck her labs in 3 months. Her cholesterol and LDL. No medications are recommended at this time, but I recommend a diet rich in healthy fruits and vegetables as well as regular exercise. The remainder of her labs are stable.

## 2020-02-12 NOTE — PROGRESS NOTES
Outbound call to patient. Patients date of birth verified. Patient made aware of lab results and recommendations per Dr. Harsh Uriarte. Patient verbalized understanding.

## 2021-10-05 ENCOUNTER — OFFICE VISIT (OUTPATIENT)
Dept: FAMILY MEDICINE CLINIC | Age: 41
End: 2021-10-05
Payer: COMMERCIAL

## 2021-10-05 VITALS
RESPIRATION RATE: 16 BRPM | SYSTOLIC BLOOD PRESSURE: 125 MMHG | TEMPERATURE: 98.1 F | BODY MASS INDEX: 35.36 KG/M2 | HEART RATE: 96 BPM | OXYGEN SATURATION: 98 % | HEIGHT: 66 IN | DIASTOLIC BLOOD PRESSURE: 80 MMHG | WEIGHT: 220 LBS

## 2021-10-05 DIAGNOSIS — E55.9 VITAMIN D DEFICIENCY: ICD-10-CM

## 2021-10-05 DIAGNOSIS — G89.29 CHRONIC PAIN OF LEFT KNEE: ICD-10-CM

## 2021-10-05 DIAGNOSIS — E78.5 DYSLIPIDEMIA: ICD-10-CM

## 2021-10-05 DIAGNOSIS — M54.42 CHRONIC MIDLINE LOW BACK PAIN WITH LEFT-SIDED SCIATICA: ICD-10-CM

## 2021-10-05 DIAGNOSIS — G89.29 CHRONIC MIDLINE LOW BACK PAIN WITH LEFT-SIDED SCIATICA: ICD-10-CM

## 2021-10-05 DIAGNOSIS — M25.562 CHRONIC PAIN OF LEFT KNEE: ICD-10-CM

## 2021-10-05 DIAGNOSIS — Z13.1 SCREENING FOR DIABETES MELLITUS: ICD-10-CM

## 2021-10-05 DIAGNOSIS — J30.89 ENVIRONMENTAL AND SEASONAL ALLERGIES: ICD-10-CM

## 2021-10-05 DIAGNOSIS — Z87.828 HISTORY OF TORN MENISCUS OF KNEE: ICD-10-CM

## 2021-10-05 DIAGNOSIS — Z00.00 ANNUAL PHYSICAL EXAM: Primary | ICD-10-CM

## 2021-10-05 DIAGNOSIS — E66.01 SEVERE OBESITY (HCC): ICD-10-CM

## 2021-10-05 DIAGNOSIS — I10 ESSENTIAL HYPERTENSION: ICD-10-CM

## 2021-10-05 DIAGNOSIS — Z11.59 ENCOUNTER FOR HEPATITIS C SCREENING TEST FOR LOW RISK PATIENT: ICD-10-CM

## 2021-10-05 PROCEDURE — 99396 PREV VISIT EST AGE 40-64: CPT | Performed by: FAMILY MEDICINE

## 2021-10-05 RX ORDER — LORATADINE AND PSEUDOEPHEDRINE SULFATE 10; 240 MG/1; MG/1
1 TABLET, EXTENDED RELEASE ORAL
Qty: 90 TABLET | Refills: 1 | Status: SHIPPED | OUTPATIENT
Start: 2021-10-05

## 2021-10-05 NOTE — PROGRESS NOTES
Chief Complaint   Patient presents with    Complete Physical        1. \"Have you been to the ER, urgent care clinic since your last visit? Hospitalized since your last visit? \" No    2. \"Have you seen or consulted any other health care providers outside of the [address] [place] [address] since your last visit? \" No     3. For patients aged 39-70: Has the patient had a colonoscopy? No     If the patient is female:    4. For patients aged 41-77: Has the patient had a mammogram within the past 2 years? Yes, HM satisfied with blue hyperlink    5. For patients aged 21-30: Has the patient had a pap smear?  Yes, HM satisfied with blue hyperlink    3 most recent PHQ Screens 10/5/2021   Little interest or pleasure in doing things Not at all   Feeling down, depressed, irritable, or hopeless Not at all   Total Score PHQ 2 0

## 2021-10-05 NOTE — PATIENT INSTRUCTIONS
Well Visit, Ages 25 to 48: Care Instructions  Overview     Well visits can help you stay healthy. Your doctor has checked your overall health and may have suggested ways to take good care of yourself. Your doctor also may have recommended tests. At home, you can help prevent illness with healthy eating, regular exercise, and other steps. Follow-up care is a key part of your treatment and safety. Be sure to make and go to all appointments, and call your doctor if you are having problems. It's also a good idea to know your test results and keep a list of the medicines you take. How can you care for yourself at home? · Get screening tests that you and your doctor decide on. Screening helps find diseases before any symptoms appear. · Eat healthy foods. Choose fruits, vegetables, whole grains, protein, and low-fat dairy foods. Limit fat, especially saturated fat. Reduce salt in your diet. · Limit alcohol. If you are a man, have no more than 2 drinks a day or 14 drinks a week. If you are a woman, have no more than 1 drink a day or 7 drinks a week. · Get at least 30 minutes of physical activity on most days of the week. Walking is a good choice. You also may want to do other activities, such as running, swimming, cycling, or playing tennis or team sports. Discuss any changes in your exercise program with your doctor. · Reach and stay at a healthy weight. This will lower your risk for many problems, such as obesity, diabetes, heart disease, and high blood pressure. · Do not smoke or allow others to smoke around you. If you need help quitting, talk to your doctor about stop-smoking programs and medicines. These can increase your chances of quitting for good. · Care for your mental health. It is easy to get weighed down by worry and stress. Learn strategies to manage stress, like deep breathing and mindfulness, and stay connected with your family and community.  If you find you often feel sad or hopeless, talk with your doctor. Treatment can help. · Talk to your doctor about whether you have any risk factors for sexually transmitted infections (STIs). You can help prevent STIs if you wait to have sex with a new partner (or partners) until you've each been tested for STIs. It also helps if you use condoms (male or female condoms) and if you limit your sex partners to one person who only has sex with you. Vaccines are available for some STIs, such as HPV. · Use birth control if it's important to you to prevent pregnancy. Talk with your doctor about the choices available and what might be best for you. · If you think you may have a problem with alcohol or drug use, talk to your doctor. This includes prescription medicines (such as amphetamines and opioids) and illegal drugs (such as cocaine and methamphetamine). Your doctor can help you figure out what type of treatment is best for you. · Protect your skin from too much sun. When you're outdoors from 10 a.m. to 4 p.m., stay in the shade or cover up with clothing and a hat with a wide brim. Wear sunglasses that block UV rays. Even when it's cloudy, put broad-spectrum sunscreen (SPF 30 or higher) on any exposed skin. · See a dentist one or two times a year for checkups and to have your teeth cleaned. · Wear a seat belt in the car. When should you call for help? Watch closely for changes in your health, and be sure to contact your doctor if you have any problems or symptoms that concern you. Where can you learn more? Go to http://www.[employer].com/  Enter P072 in the search box to learn more about \"Well Visit, Ages 25 to 48: Care Instructions. \"  Current as of: February 11, 2021               Content Version: 13.0  © 4577-9902 [employer]. Care instructions adapted under license by [employer] (which disclaims liability or warranty for this information).  If you have questions about a medical condition or this instruction, always ask your healthcare professional. [place] [address] any warranty or liability for your use of this information.

## 2021-10-05 NOTE — PROGRESS NOTES
Ubaldo Rod  38 y.o. female  1980  [address]  [id number]     [facility]       Encounter Date: 10/5/2021           Established Patient Visit Note: Antoni Busby MD    Reason for Appointment:  Chief Complaint   Patient presents with    Complete Physical       History of Present Illness:  History provided by patient    Dhruv Way is a 38 y.o. female who presents to clinic today for:    Annual Physical  Screenings: has had mammogram and PAP with GYN; she denies any concern for depression or STI  Immunizations: she has had COVID vaccine; she will get flu vaccine with work, she had Tdap about 4 year ago  Diet: she is doing weight watchers  Exercise: she is walking; she has exercise equipment coming to the house  Interval History: denies any recent hospitalizations or illnesses. She did have pneumonia in Dec, 2020. Hx of Torn Meniscus on Left Knee: she had surgery about 10 years ago. she reports that her knee has been a little more bothersome recently. She belives it to be related to the recent move. Low Back Pain: midline, has sciatica on left leg; she reports that she has had this for several years, but it has been a little worse recently  Obesity: her goal is to lose 5% or < 30 BMI for weight. She is doing weight watchers and she has lost 7 lbs. HTN: she reports that she stopped taking her BP medicine a few months ago because she started walking and eating healthier  Vitamin D: she is presently taking women's MTV one per day  Allergies: she reports having a dog in the house that has caused congestion and runny nose      Review of Systems  All other ROS were reviewed and are negative except as discussed in HPI        Allergies: Patient has no known allergies.     Medications: (Updated to reflect final medication list after visit)    Current Outpatient Medications:     loratadine-pseudoephedrine (Claritin-D 24 Hour)  mg per tablet, Take 1 Tablet by mouth daily as needed (allergies). , Disp: 90 Tablet, Rfl: 1    B-complex with vitamin C (SUPER B COMPLEX-VITAMIN C PO), Super B Complex-Vitamin C, Disp: , Rfl:     multivit-min-ferrous fumarate (MULTI VITAMIN) 9 mg iron/15 mL liqd, Multi Vitamin, Disp: , Rfl:     levonorgestreL (MIRENA) 20 mcg/24 hours (5 yrs) 52 mg IUD, Mirena 20 mcg/24 hours (5 yrs) 52 mg intrauterine device  Take by intrauterine route., Disp: , Rfl:     History  Patient Care Team:  Glenn Cordero MD as PCP - General (Family Medicine)  Glenn Cordero MD as PCP - [facility]  Cheryl Moore MD as Physician (Internal Medicine)    Past Medical History: she has a past medical history of Anemia (), Diet controlled gestational diabetes mellitus (GDM) in third trimester (2016), Ectopic pregnancy, Epilepsy ([facility] [address] and Gestational diabetes (). Past Surgical History: she has a past surgical history that includes hx gyn; pr  delivery only (); pr  delivery only (); hx other surgical (); hx other surgical (); hx other surgical (); hx other surgical (); and hx other surgical (). Family Medical History: family history includes Arthritis-osteo in her maternal grandfather, maternal grandmother, and mother; Cancer in her father, maternal grandfather, and maternal grandmother; Diabetes in her mother; Elevated Lipids in her maternal grandmother; Heart Failure in her father; Hypertension in her maternal grandmother and mother; No Known Problems in her sister; Stroke in her maternal grandmother. Social History: she reports that she has never smoked. She has never used smokeless tobacco. She reports current alcohol use. She reports that she does not use drugs.     Health Maintenance Due   Topic Date Due    Hepatitis C Screening  Never done    COVID-19 Vaccine (1) Never done    DTaP/Tdap/Td series (1 - Tdap) Never done    Flu Vaccine (1) 2021       Objective: Visit Vitals  /80 (BP 1 Location: Left arm, BP Patient Position: Sitting, BP Cuff Size: Adult)   Pulse 96   Temp 98.1 °F (36.7 °C)   Resp 16   Ht 5' 6\" (1.676 m)   Wt 220 lb (99.8 kg)   SpO2 98%   BMI 35.51 kg/m²     Wt Readings from Last 3 Encounters:   10/05/21 220 lb (99.8 kg)   01/22/20 219 lb (99.3 kg)   12/20/17 218 lb 6 oz (99.1 kg)       Physical Exam  Vitals reviewed. Constitutional:       General: She is not in acute distress. Appearance: Normal appearance. She is normal weight. She is not ill-appearing or toxic-appearing. HENT:      Head: Normocephalic and atraumatic. Right Ear: Hearing, tympanic membrane, ear canal and external ear normal. No drainage. No middle ear effusion. There is no impacted cerumen. Tympanic membrane is not injected or erythematous. Left Ear: Hearing, tympanic membrane, ear canal and external ear normal. No drainage. No middle ear effusion. There is no impacted cerumen. Tympanic membrane is not injected or erythematous. Nose: Nose normal. No nasal deformity or septal deviation. Mouth/Throat:      Lips: Pink. No lesions. Mouth: Mucous membranes are moist.      Palate: No mass. Pharynx: Oropharynx is clear. Uvula midline. No pharyngeal swelling, oropharyngeal exudate or posterior oropharyngeal erythema. Tonsils: No tonsillar exudate. Eyes:      General: Lids are normal. Vision grossly intact. Gaze aligned appropriately. Right eye: No discharge. Left eye: No discharge. Extraocular Movements: Extraocular movements intact. Conjunctiva/sclera: Conjunctivae normal.      Right eye: Right conjunctiva is not injected. Left eye: Left conjunctiva is not injected. Pupils: Pupils are equal, round, and reactive to light. Neck:      Vascular: No carotid bruit. Cardiovascular:      Rate and Rhythm: Normal rate and regular rhythm. Pulses: Normal pulses. Heart sounds: Normal heart sounds.  No murmur heard.   No friction rub. No gallop. Pulmonary:      Effort: Pulmonary effort is normal. No respiratory distress. Breath sounds: Normal breath sounds. No stridor. No wheezing, rhonchi or rales. Abdominal:      General: Bowel sounds are normal. There is no distension or abdominal bruit. Tenderness: There is no abdominal tenderness. There is no guarding. Musculoskeletal:         General: Normal range of motion. Cervical back: Normal range of motion and neck supple. No rigidity. No muscular tenderness. Lumbar back: Normal.      Right knee: Normal.      Left knee: Normal.   Lymphadenopathy:      Cervical: No cervical adenopathy. Skin:     General: Skin is warm. Coloration: Skin is not jaundiced. Neurological:      General: No focal deficit present. Mental Status: She is alert. Motor: Motor function is intact. Coordination: Coordination is intact. Gait: Gait is intact. Deep Tendon Reflexes:      Reflex Scores:       Patellar reflexes are 2+ on the right side and 2+ on the left side. Psychiatric:         Attention and Perception: Attention and perception normal.         Mood and Affect: Mood and affect normal.         Speech: Speech normal.         Cognition and Memory: Cognition and memory normal.         Assessment & Plan:      ICD-10-CM ICD-9-CM    1. Annual physical exam  Z00.00 V70.0    2. Essential hypertension  I10 401.9 CBC W/O DIFF      LIPID PANEL      METABOLIC PANEL, COMPREHENSIVE      URINALYSIS W/ RFLX MICROSCOPIC      TSH 3RD GENERATION      CBC W/O DIFF      LIPID PANEL      METABOLIC PANEL, COMPREHENSIVE      URINALYSIS W/ RFLX MICROSCOPIC      TSH 3RD GENERATION   3. Severe obesity ([address]  E66.01 278.01    4. Dyslipidemia  E78.5 272.4 LIPID PANEL      METABOLIC PANEL, COMPREHENSIVE      LIPID PANEL      METABOLIC PANEL, COMPREHENSIVE   5.  Vitamin D deficiency  E55.9 268.9 VITAMIN D, 25 HYDROXY      VITAMIN D, 25 HYDROXY   6. Screening for diabetes mellitus  Z13.1 V77.1 HEMOGLOBIN A1C WITH EAG      HEMOGLOBIN A1C WITH EAG   7. Encounter for hepatitis C screening test for low risk patient  Z11.59 V73.89 HCV AB W/RFLX TO JULIO      HCV AB W/RFLX TO JULIO   8. Environmental and seasonal allergies  J30.89 477.8 loratadine-pseudoephedrine (Claritin-D 24 Hour)  mg per tablet   9. Chronic pain of left knee  M25.562 719.46     G89.29 338.29    10. History of torn meniscus of knee  Z87.828 V15.59    11. Chronic midline low back pain with left-sided sciatica  M54.42 724.2     G89.29 724.3      338.29       Annual Physical Exam: Reviewed and addressed patient's medical history and concerns as discussed in note. Reviewed recommended screenings and immunizations. Discussed recommendations for diet, exercise, and lifestyle. · Left Jd Pain, Low Back Pain: she reports that she would like to hold off on xray, PT, or specialist visit at this time. · Allergies: Chronic, worsening. Consider referral to allergy if not improved. · Obesity: I have reviewed/discussed the above normal BMI with the patient. I have recommended the following interventions: dietary management education, guidance, and counseling, encourage exercise, monitor weight and prescribed dietary intake. All other conditions listed above: chronic and stable. Will continue current treatment regimen. Will check labs as reflected above. Discussed recommendations for diet and exercise. I have discussed the diagnosis with the patient and the intended plan as seen in the above orders. The patient has received an after-visit summary along with patient information handout. I have discussed medication side effects and warnings with the patient as well. Disposition  Follow-up and Dispositions    · Return in about 3 months (around 1/5/2022).            Hugo Hsieh MD

## 2022-03-18 PROBLEM — N76.0 BV (BACTERIAL VAGINOSIS): Status: ACTIVE | Noted: 2020-01-23

## 2022-03-18 PROBLEM — E66.01 SEVERE OBESITY (HCC): Status: ACTIVE | Noted: 2020-01-22

## 2022-03-18 PROBLEM — G89.29 CHRONIC PELVIC PAIN IN FEMALE: Status: ACTIVE | Noted: 2020-01-23

## 2022-03-18 PROBLEM — R10.2 CHRONIC PELVIC PAIN IN FEMALE: Status: ACTIVE | Noted: 2020-01-23

## 2022-03-18 PROBLEM — R06.83 SNORING: Status: ACTIVE | Noted: 2020-01-23

## 2022-03-18 PROBLEM — B96.89 BV (BACTERIAL VAGINOSIS): Status: ACTIVE | Noted: 2020-01-23

## 2022-03-18 PROBLEM — M79.604 RIGHT LEG PAIN: Status: ACTIVE | Noted: 2020-01-23

## 2022-03-19 PROBLEM — F41.9 ANXIETY: Status: ACTIVE | Noted: 2020-01-23

## 2022-03-19 PROBLEM — L73.2 HYDRADENITIS: Status: ACTIVE | Noted: 2020-01-23

## 2022-03-19 PROBLEM — G62.9 NEUROPATHY: Status: ACTIVE | Noted: 2020-01-23

## 2022-03-19 PROBLEM — I10 ESSENTIAL HYPERTENSION: Status: ACTIVE | Noted: 2020-01-23

## 2023-01-11 ENCOUNTER — VIRTUAL VISIT (OUTPATIENT)
Dept: FAMILY MEDICINE CLINIC | Age: 43
End: 2023-01-11
Payer: COMMERCIAL

## 2023-01-11 DIAGNOSIS — J30.89 ENVIRONMENTAL AND SEASONAL ALLERGIES: ICD-10-CM

## 2023-01-11 DIAGNOSIS — E11.9 TYPE 2 DIABETES MELLITUS WITHOUT COMPLICATION, WITHOUT LONG-TERM CURRENT USE OF INSULIN (HCC): ICD-10-CM

## 2023-01-11 DIAGNOSIS — R53.83 FATIGUE, UNSPECIFIED TYPE: ICD-10-CM

## 2023-01-11 DIAGNOSIS — E66.01 SEVERE OBESITY (HCC): Primary | ICD-10-CM

## 2023-01-11 DIAGNOSIS — E55.9 VITAMIN D DEFICIENCY: ICD-10-CM

## 2023-01-11 DIAGNOSIS — I10 ESSENTIAL HYPERTENSION: ICD-10-CM

## 2023-01-11 DIAGNOSIS — M25.50 MULTIPLE JOINT PAIN: ICD-10-CM

## 2023-01-11 DIAGNOSIS — R19.4 FREQUENT BOWEL MOVEMENTS: ICD-10-CM

## 2023-01-11 DIAGNOSIS — R41.89 COGNITIVE CHANGE: ICD-10-CM

## 2023-01-11 PROCEDURE — 99214 OFFICE O/P EST MOD 30 MIN: CPT | Performed by: FAMILY MEDICINE

## 2023-01-11 RX ORDER — FLASH GLUCOSE SENSOR
KIT MISCELLANEOUS
Qty: 1 KIT | Refills: 0 | Status: SHIPPED | OUTPATIENT
Start: 2023-01-11

## 2023-01-11 RX ORDER — SPIRONOLACTONE 50 MG/1
50 TABLET, FILM COATED ORAL DAILY
COMMUNITY
Start: 2022-12-14

## 2023-01-11 RX ORDER — GLIPIZIDE 2.5 MG/1
2.5 TABLET, EXTENDED RELEASE ORAL DAILY
Qty: 90 TABLET | Refills: 1 | Status: SHIPPED | OUTPATIENT
Start: 2023-01-11

## 2023-01-11 NOTE — PROGRESS NOTES
Fernandez Russell  36 y.o. female  1980  [address] [facility] [id number]  [facility]       Encounter Date: 1/11/2023           Established Patient Visit Note: Rosey Astudillo MD    Reason for Appointment:  No chief complaint on file. History of Present Illness:  History provided by patient    Fernandez Russell is a 36 y.o. female who presents to clinic today for:     Patient last seen on 10/5/2021 and advised to follow up in 3 months. However, she has not followed up in clinic until today. She reports \"nurses are the worst patients\" . Labs were ordered at visit on 10/5/21, but patient never did these. Multiple Joint Pain/Fatigue: she reports feeling stiff when she wakes up in the morning. DM2: Patient reports that this was recently diagnosed in ER. She reports that she was metformin was prescribed, but she did not take it because she already goes to the bathroom several times per day. She is not checking her blood glucose. She states \"I don't foresee me checking my sugar because it will cause my hand to hurt\". HS: she is followed by dermatology Manuela Huynh) who placed her on Spironolactone 50mg daily. She reports that since starting it, she has felt fatigued and she has had blurry vision. She reports that her blood pressure has been 130s/80s. Frequent Bowel Movements: she reports having to us the bathroom several times per day. She reports that as soon as she eats, she will have to go to the bathroom. Obesity: she reports that she has lost weight recently. HTN: She reports home BPs as 094H to 576F systolic. Prior Medications: amlodipine (she reports stopping this when she started the spironolactone)  Cognitive Change: she reports that her memory has not been as good as it used to be. She denies any significant neurologic changes. She denies forgetting names of close family members. She has labs from Dec, 2022.  She reports Creat as 0.76 with Glucose 331. Screening  Mammogram: she reports having this performed with [facility]) in  with normal results. Cervical Cancer Screening:  managed by [facility])    Review of Systems  All other ROS were reviewed and are negative except as discussed in HPI      Allergies: Patient has no known allergies. Medications:     Current Outpatient Medications:     loratadine-pseudoephedrine (Claritin-D 24 Hour)  mg per tablet, Take 1 Tablet by mouth daily as needed (allergies). , Disp: 90 Tablet, Rfl: 1    B-complex with vitamin C (SUPER B COMPLEX-VITAMIN C PO), Super B Complex-Vitamin C, Disp: , Rfl:     multivit-min-ferrous fumarate (MULTI VITAMIN) 9 mg iron/15 mL liqd, Multi Vitamin, Disp: , Rfl:     levonorgestreL (MIRENA) 20 mcg/24 hours (5 yrs) 52 mg IUD, Mirena 20 mcg/24 hours (5 yrs) 52 mg intrauterine device  Take by intrauterine route., Disp: , Rfl:     History  Patient Care Team:  Ko Chaney MD as PCP - General (Family Medicine)  Ko Chaney MD as PCP - [address] [place] [address] [facility]  Rafat Gama MD as Physician (Internal Medicine Physician)    Past Medical History: she has a past medical history of Anemia (), Diet controlled gestational diabetes mellitus (GDM) in third trimester (2016), Ectopic pregnancy, Epilepsy ([facility] [address] and Gestational diabetes (). Past Surgical History: she has a past surgical history that includes hx gyn; pr  delivery only (); pr  delivery only (); hx other surgical (); hx other surgical (); hx other surgical (); hx other surgical (); and hx other surgical ().     Family Medical History: family history includes Cancer in her father, maternal grandfather, and maternal grandmother; Diabetes in her mother; Elevated Lipids in her maternal grandmother; Heart Failure in her father; Hypertension in her maternal grandmother and mother; No Known Problems in her sister; OSTEOARTHRITIS in her maternal grandfather, maternal grandmother, and mother; Stroke in her maternal grandmother. Social History: she reports that she has never smoked. She has never used smokeless tobacco. She reports current alcohol use. She reports that she does not use drugs. Objective: There were no vitals taken for this visit. Wt Readings from Last 3 Encounters:   10/05/21 220 lb (99.8 kg)   01/22/20 219 lb (99.3 kg)   12/20/17 218 lb 6 oz (99.1 kg)       Physical Exam    Assessment & Plan:    {No Diagnosis Found}    Advised holding the spironolactone until she is able to discuss further with dermatology. She reports having glucometer at home, and she reports that she does not need us to send one. She declines daily BG check, but she does agree to check BG at least once weekly. Start Glipizide      I have discussed the diagnosis with the patient and the intended plan as seen in the above orders. The patient has received an after-visit summary along with patient information handout. I have discussed medication side effects and warnings with the patient as well.     Disposition        Sharlene Abraham MD HEMOGLOBIN A1C WITH EAG      URINALYSIS W/ RFLX MICROSCOPIC      TSH 3RD GENERATION      glipiZIDE SR (GLUCOTROL XL) 2.5 mg CR tablet      flash glucose sensor (FreeStyle Boyd 2 Sensor) kit      3. Multiple joint pain  M25.50 719.49 RHEUMASSURE      RHEUMASSURE      4. Fatigue, unspecified type  R53.83 780.79 RAINER, DIRECT, W/REFLEX      RAINER, DIRECT, W/REFLEX      5. Frequent bowel movements  R19.4 787.99 LIPASE      REFERRAL TO GASTROENTEROLOGY      US ABD COMP      LIPASE      6. Vitamin D deficiency  E55.9 268.9 VITAMIN D, 25 HYDROXY      VITAMIN D, 25 HYDROXY      7. Cognitive change  R41.89 799.59 REFERRAL TO NEUROPSYCHOLOGY      8. Environmental and seasonal allergies  J30.89 477.8       9. Essential hypertension  I10 401.9         Obesity: I have reviewed/discussed the above normal BMI with the patient. I have recommended the following interventions: dietary management education, guidance, and counseling, encourage exercise, monitor weight and prescribed dietary intake. Fatigue, Blurry vision: Advised holding the spironolactone until she is able to discuss further with dermatology. Advised calling or going to urgent care if symptoms no improved with holding spironolactone. Advised scheduling visit with eye doctor. DM2: Chronic, unclear control d/t nonadherance, but she does agree to try. She reports having glucometer at home, and she reports that she does not need us to send one. She declines daily BG check, but she does agree to check BG at least once weekly. She also agrees to try freestyle boyd. Will start low dose Glipizide and patient agrees to schedule lab visit. Discussed red flag symptoms and reasons to call or go to ED  Frequent Bowel Movements: Chronic, uncontrolled. Evaluate further as above and referral to Gastroenterology. Discussed red flag symptoms and reasons to call or go to ED  Cognitive Change: New problem, uncontrolled. Evaluate further as above.    Fatigue, Multiple Joint Pain: Chronic, uncontrolled. Evaluate further as above         I was in the office while conducting this encounter. Consent:  She and/or her healthcare decision maker is aware that this patient-initiated Telehealth encounter is a billable service, with coverage as determined by her insurance carrier. She is aware that she may receive a bill and has provided verbal consent to proceed: Yes    This virtual visit was conducted via [address] Pursuant to the emergency declaration under the [facility] waiver authority and the [employer] Act, this Virtual  Visit was conducted to reduce the patient's risk of exposure to COVID-19 and provide continuity of care for an established patient. Services were provided through a video synchronous discussion virtually to substitute for in-person clinic visit. Due to this being a TeleHealth evaluation, many elements of the physical examination are unable to be assessed. Total Time: minutes: 30-39 . I have discussed the diagnosis with the patient and the intended plan as seen in the above orders. The patient has received an after-visit summary along with patient information handout. I have discussed medication side effects and warnings with the patient as well. Disposition  Follow-up and Dispositions    Return in about 2 weeks (around 1/25/2023) for follow up of chronic conditions.            Corinna Grewal MD

## 2023-01-13 ENCOUNTER — TELEPHONE (OUTPATIENT)
Dept: FAMILY MEDICINE CLINIC | Age: 43
End: 2023-01-13

## 2023-01-13 NOTE — TELEPHONE ENCOUNTER
Chief Complaint   Patient presents with    Prior Auth     glipiZIDE ER 2.5MG er tablets; PA has been approved.       PA Case: [id number], Status: Approved, Coverage Starts on: 1/13/2023 12:00:00 AM, Coverage Ends on: 1/13/2024 12:00:00 AM.

## 2023-01-16 ENCOUNTER — TELEPHONE (OUTPATIENT)
Dept: FAMILY MEDICINE CLINIC | Age: 43
End: 2023-01-16

## 2023-01-16 NOTE — TELEPHONE ENCOUNTER
----- Message from Pat Elda sent at 1/16/2023  3:50 PM EST -----  Subject: Message to Provider    QUESTIONS  Information for Provider? pt was r/s her lab apt.   ---------------------------------------------------------------------------  --------------  [id number]  [phone]; OK to leave message on voicemail  ---------------------------------------------------------------------------  --------------  SCRIPT ANSWERS  Relationship to Patient?  Self

## 2023-01-17 ENCOUNTER — APPOINTMENT (OUTPATIENT)
Dept: FAMILY MEDICINE CLINIC | Age: 43
End: 2023-01-17

## 2023-01-26 ENCOUNTER — VIRTUAL VISIT (OUTPATIENT)
Dept: FAMILY MEDICINE CLINIC | Age: 43
End: 2023-01-26
Payer: COMMERCIAL

## 2023-01-26 DIAGNOSIS — E11.9 CONTROLLED TYPE 2 DIABETES MELLITUS WITHOUT COMPLICATION, WITHOUT LONG-TERM CURRENT USE OF INSULIN (HCC): ICD-10-CM

## 2023-01-26 DIAGNOSIS — E55.9 VITAMIN D DEFICIENCY: ICD-10-CM

## 2023-01-26 DIAGNOSIS — B37.9 YEAST INFECTION: Primary | ICD-10-CM

## 2023-01-26 DIAGNOSIS — E66.01 SEVERE OBESITY (HCC): ICD-10-CM

## 2023-01-26 DIAGNOSIS — R19.4 FREQUENT BOWEL MOVEMENTS: ICD-10-CM

## 2023-01-26 DIAGNOSIS — R53.83 FATIGUE, UNSPECIFIED TYPE: ICD-10-CM

## 2023-01-26 DIAGNOSIS — M25.50 MULTIPLE JOINT PAIN: ICD-10-CM

## 2023-01-26 PROCEDURE — 3046F HEMOGLOBIN A1C LEVEL >9.0%: CPT | Performed by: FAMILY MEDICINE

## 2023-01-26 PROCEDURE — 99213 OFFICE O/P EST LOW 20 MIN: CPT | Performed by: FAMILY MEDICINE

## 2023-01-26 RX ORDER — ACETAMINOPHEN 500 MG
2000 TABLET ORAL DAILY
Qty: 90 CAPSULE | Refills: 1 | Status: SHIPPED | OUTPATIENT
Start: 2023-01-26

## 2023-01-26 RX ORDER — METFORMIN HYDROCHLORIDE 500 MG/1
500 TABLET, EXTENDED RELEASE ORAL
Qty: 30 TABLET | Refills: 1 | Status: SHIPPED | OUTPATIENT
Start: 2023-01-26

## 2023-01-26 RX ORDER — FLUCONAZOLE 150 MG/1
150 TABLET ORAL DAILY
Qty: 1 TABLET | Refills: 1 | Status: SHIPPED | OUTPATIENT
Start: 2023-01-26 | End: 2023-01-27

## 2023-01-26 RX ORDER — GLIPIZIDE 5 MG/1
5 TABLET ORAL 2 TIMES DAILY
Qty: 60 TABLET | Refills: 1 | Status: SHIPPED | OUTPATIENT
Start: 2023-01-26

## 2023-01-26 NOTE — PROGRESS NOTES
Nafisa Morin  36 y.o. female  1980  [address] [facility] [address]  [facility]       Encounter Date: 1/26/2023           Established Patient Visit Note: Laurel Garcia MD    Reason for Appointment:  No chief complaint on file. History of Present Illness:  History provided by {Relatives - adult:5061}    Nafisa Morin is a 36 y.o. female who presents today for:     Poor Adherance: ***  Multiple Joint Pain/Fatigue: she reports feeling stiff when she wakes up in the morning. Vitamin D Deficiency: she reports that has not been taking vitamin D consistently, but she plans to go back to taking it consistently. DM2: recent A1c on 1/17/23 was 13.3. She reports FBG have been in the 200s. She is taking Glipizide 2.5mg XR. She would like to stay off insulin if at all possible. HS: she is followed by dermatology Debbienavin Chakraborty). Prior Medications: Spironolactone (caused blurry vision). She reports that her dermatologist recently provided Kenalog injection, which helped. She reports that blurry vision has improvede since stopping spironolactone. Frequent Bowel Movements: she has visit with GI next week  Obesity: continues to work on diet and exercise. HTN: She reports that she has not checked BP since stopping spironolocatone. . Prior Medications: amlodipine (she reports stopping this when she started the spironolactone). Cognitive Change: she has not yet scheduled visit with neuropsychiatry. She reports that her thinking has improved since stopping the spironolactone. Yeast Infection: she reports that she went to her GYN 2 months ago. She reports that it comes and goes. She reports that she took rx of diflucan with improvement, but it came back. She reports having full STI workup that was negative, and she reports that she not been sexually active since that time. She denies any risk for pregnancy.          Screening  Mammogram: she reports having this performed [facility] [place] in 2022 with normal results. Cervical Cancer Screening:  managed by [facility] [place])        Review of Systems  All other ROS were reviewed and are negative except as discussed in HPI         Health Maintenance Due   Topic Date Due    Hepatitis C Screening  Never done    COVID-19 Vaccine (1) Never done    Pneumococcal 0-64 years (1 - PCV) Never done    DTaP/Tdap/Td series (1 - Tdap) Never done    Cervical cancer screen  01/27/2022    Depression Screen  10/05/2022         Review of Systems  ROS       Allergies: Patient has no known allergies. Medications:     Current Outpatient Medications:     spironolactone (ALDACTONE) 50 mg tablet, Take 50 mg by mouth daily. , Disp: , Rfl:     glipiZIDE SR (GLUCOTROL XL) 2.5 mg CR tablet, Take 1 Tablet by mouth daily. , Disp: 90 Tablet, Rfl: 1    flash glucose sensor (FreeStyle Boyd 2 Sensor) kit, Use to check blood sugar daily. E11.9, Disp: 1 Kit, Rfl: 0    loratadine-pseudoephedrine (Claritin-D 24 Hour)  mg per tablet, Take 1 Tablet by mouth daily as needed (allergies). , Disp: 90 Tablet, Rfl: 1    B-complex with vitamin C (SUPER B COMPLEX-VITAMIN C PO), Super B Complex-Vitamin C, Disp: , Rfl:     multivit-min-ferrous fumarate (MULTI VITAMIN) 9 mg iron/15 mL liqd, Multi Vitamin, Disp: , Rfl:     levonorgestreL (MIRENA) 20 mcg/24 hours (5 yrs) 52 mg IUD, Mirena 20 mcg/24 hours (5 yrs) 52 mg intrauterine device  Take by intrauterine route., Disp: , Rfl:     History  Patient Care Team:  Drew Cooper MD as PCP - General (Family Medicine)  Drew Cooper MD as PCP - [facility]  Natali Sparks MD as Physician (Internal Medicine Physician)    Past Medical History: she has a past medical history of Anemia (2016), Diet controlled gestational diabetes mellitus (GDM) in third trimester (12/8/2016), Ectopic pregnancy, Epilepsy ([facility] [address] and Gestational diabetes (2016).     Past Surgical History: she has a past surgical history that includes hx gyn; pr  delivery only (); pr  delivery only (); hx other surgical (); hx other surgical (); hx other surgical (); hx other surgical (); and hx other surgical (). Family Medical History: family history includes Cancer in her father, maternal grandfather, and maternal grandmother; Diabetes in her mother; Elevated Lipids in her maternal grandmother; Heart Failure in her father; Hypertension in her maternal grandmother and mother; No Known Problems in her sister; OSTEOARTHRITIS in her maternal grandfather, maternal grandmother, and mother; Stroke in her maternal grandmother. Social History: she reports that she has never smoked. She has never used smokeless tobacco. She reports current alcohol use. She reports that she does not use drugs. Objective:     Physical Exam    Constitutional:       General: Not in acute distress. Appearance: Normal appearance. *** Not ill-appearing, *** Not toxic-appearing. HENT:      Head: Normocephalic. Right Ear: External ear normal.      Left Ear: External ear normal.      Nose: Nose normal.   Eyes:      General: No scleral icterus. Right eye: No discharge. Left eye: No discharge. Extraocular Movements: Extraocular movements intact. Conjunctiva/sclera: Conjunctivae normal.   Neck:      Musculoskeletal: Normal range of motion. Pulmonary:      Effort: Pulmonary effort is normal. No respiratory distress. Neurological:      Mental Status: Mental status is at baseline. Psychiatric:         Mood and Affect: Mood normal.         Behavior: Behavior normal.         Thought Content: Thought content normal.         Judgment: Judgment normal.       Assessment & Plan:    {No Diagnosis Found}    Treat with diflucan  Start vitamin d  Switch glipizide XL 2.5mg daily to glipizide 5mg bid. Start Metformin. Advised to read about Rybelsus and Jardiance.    Advised to read about Pravastatin. Advised to checking BP and if elevated restart amlodipine  RTC 2 weeks    I was *** the office while conducting this encounter. Consent:  She and/or her healthcare decision maker is aware that this patient-initiated Telehealth encounter is a billable service, with coverage as determined by her insurance carrier. She is aware that she may receive a bill and has provided verbal consent to proceed: Yes    This virtual visit was conducted {VIRTUAL HJCL:36941}    Total Time: {minutes:29403::\"5-10 minutes\"}. I have discussed the diagnosis with the patient and the intended plan as seen in the above orders. The patient has received an after-visit summary along with patient information handout. I have discussed medication side effects and warnings with the patient as well.     Disposition        Rosy Shoemaker MD virtual visit was conducted via [address] Pursuant to the emergency declaration under the [facility], [facility] waiver authority and the [employer] and [employer] Act, this Virtual  Visit was conducted to reduce the patient's risk of exposure to COVID-19 and provide continuity of care for an established patient. Services were provided through a video synchronous discussion virtually to substitute for in-person clinic visit. Due to this being a TeleHealth evaluation, many elements of the physical examination are unable to be assessed. Total Time: minutes: 21-30 minutes. I have discussed the diagnosis with the patient and the intended plan as seen in the above orders. The patient has received an after-visit summary along with patient information handout. I have discussed medication side effects and warnings with the patient as well. Disposition  Follow-up and Dispositions    Return in about 2 weeks (around 2/9/2023).            Khadra Gongora MD

## 2023-04-19 ENCOUNTER — VIRTUAL VISIT (OUTPATIENT)
Dept: FAMILY MEDICINE CLINIC | Age: 43
End: 2023-04-19
Payer: COMMERCIAL

## 2023-04-19 DIAGNOSIS — J01.00 ACUTE NON-RECURRENT MAXILLARY SINUSITIS: Primary | ICD-10-CM

## 2023-04-19 PROCEDURE — 99214 OFFICE O/P EST MOD 30 MIN: CPT | Performed by: STUDENT IN AN ORGANIZED HEALTH CARE EDUCATION/TRAINING PROGRAM

## 2023-04-19 RX ORDER — AMOXICILLIN AND CLAVULANATE POTASSIUM 875; 125 MG/1; MG/1
1 TABLET, FILM COATED ORAL EVERY 12 HOURS
Qty: 14 TABLET | Refills: 0 | Status: SHIPPED | OUTPATIENT
Start: 2023-04-19 | End: 2023-04-26

## 2023-04-19 NOTE — PROGRESS NOTES
Mya Rod  36 y.o. female  1980  [address] [facility] [address] [address] [facility]  Telemedicine Progress Note  [facility]       Encounter Date and Time: April 19, 2023 at 3:01 PM    Consent:  She and/or the health care decision maker is aware that that she may receive a bill for this telephone service, depending on her insurance coverage, and has provided verbal consent to proceed: Yes    No chief complaint on file. History of Present Illness   Harshal Davis is a 36 y.o. female was evaluated by synchronous (real-time) audio-video technology from home, through the Eligible Patient Portal.    2 days of left sided face and ear pain, throat pain  Took Sudafed which helped some  Throat is very sore   Takes Claritin D   Negative COVID    Review of Systems   See HPI    Vitals/Objective:     General: alert, cooperative, no distress   Mental  status: mental status: alert, oriented to person, place, and time, normal mood, behavior, speech, dress, motor activity, and thought processes   Resp: resp: normal effort and no respiratory distress   Neuro: neuro: no gross deficits   Skin: skin: no discoloration or lesions of concern on visible areas   Due to this being a TeleHealth evaluation, many elements of the physical examination are unable to be assessed. Assessment and Plan:   Time-based coding, delete if not needed: I spent at least 15 minutes with this established patient, and >50% of the time was spent counseling and/or coordinating care regarding sinusitis    1. Acute non-recurrent maxillary sinusitis  - amoxicillin-clavulanate (AUGMENTIN) 875-125 mg per tablet; Take 1 Tablet by mouth every twelve (12) hours for 7 days. Dispense: 14 Tablet; Refill: 0           We discussed the expected course, resolution and complications of the diagnosis(es) in detail. Medication risks, benefits, costs, interactions, and alternatives were discussed as indicated.   I advised her to contact the office if her condition worsens, changes or fails to improve as anticipated. She expressed understanding with the diagnosis(es) and plan. Patient understands that this encounter was a temporary measure, and the importance of further follow up and examination was emphasized. Patient verbalized understanding. Patient informed to follow up: for annual visit. Electronically Signed: Maggie Riojas DO    Providers location when delivering service: clinic     CPT Codes 22788-67292 for Established Patients may apply to this Telehealth Visit. POS code: 18. Modifier GT      Pursuant to the emergency declaration under the [facility], [facility] waiver authority and the [employer] and [employer] Act, this Virtual  Visit was conducted, with patient's consent, to reduce the patient's risk of exposure to COVID-19 and provide continuity of care for an established patient. Services were provided through a video synchronous discussion virtually to substitute for in-person clinic visit. History   Patients past medical, surgical and family histories were reviewed and updated. Past Medical History:   Diagnosis Date    Anemia 2016    This pregnancy only    Diet controlled gestational diabetes mellitus (GDM) in third trimester 2016    Ectopic pregnancy     Epilepsy ([facility] [address]     as infant with fever    Gestational diabetes 2016    per the 3 hour glucose     Past Surgical History:   Procedure Laterality Date    HX GYN      left fallopian tube removal    HX OTHER SURGICAL      lap for ectopic, L fallop.  tube removed    HX OTHER SURGICAL      oral surgery, teeth extracted    HX OTHER SURGICAL      surgery under L arm    HX OTHER SURGICAL      L knee surgery, torn meniscus    HX OTHER SURGICAL      T&A    CO  DELIVERY ONLY      CO  DELIVERY ONLY       Family History   Problem Relation Age of Onset    OSTEOARTHRITIS Mother     Diabetes Mother     Hypertension Mother     Cancer Father         throat cancer    Heart Failure Father     OSTEOARTHRITIS Maternal Grandmother     Cancer Maternal Grandmother     Elevated Lipids Maternal Grandmother     Hypertension Maternal Grandmother     Stroke Maternal Grandmother     OSTEOARTHRITIS Maternal Grandfather     Cancer Maternal Grandfather     No Known Problems Sister      Social History     Socioeconomic History    Marital status: SINGLE     Spouse name: Not on file    Number of children: Not on file    Years of education: Not on file    Highest education level: Not on file   Occupational History    Not on file   Tobacco Use    Smoking status: Never    Smokeless tobacco: Never   Substance and Sexual Activity    Alcohol use: Yes     Comment: social    Drug use: No    Sexual activity: Never     Partners: Male     Birth control/protection: I.U.D. Other Topics Concern    Not on file   Social History Narrative    Not on file     Social Determinants of Health     Financial Resource Strain: Not on file   Food Insecurity: Not on file   Transportation Needs: Not on file   Physical Activity: Not on file   Stress: Not on file   Social Connections: Not on file   Intimate Partner Violence: Not on file   Housing Stability: Not on file     Patient Active Problem List   Diagnosis Code    Severe obesity ([facility] [address] E66.01    Snoring R06.83    Right leg pain M79.604    Neuropathy G62.9    Chronic pelvic pain in female R10.2, G89.29    BV (bacterial vaginosis) N76.0, B96.89    Hydradenitis L73.2    Anxiety F41.9    Essential hypertension I10          Current Medications/Allergies   Medications and Allergies reviewed:    Current Outpatient Medications   Medication Sig Dispense Refill    amoxicillin-clavulanate (AUGMENTIN) 875-125 mg per tablet Take 1 Tablet by mouth every twelve (12) hours for 7 days.  14 Tablet 0    metFORMIN ER (GLUCOPHAGE XR) 500 mg tablet Take 1 Tablet by mouth daily (with dinner). 30 Tablet 0    cholecalciferol (VITAMIN D3) (2,000 UNITS /50 MCG) cap capsule Take 1 Capsule by mouth daily. 90 Capsule 1    glipiZIDE (GLUCOTROL) 5 mg tablet Take 1 Tablet by mouth two (2) times a day. 60 Tablet 1    spironolactone (ALDACTONE) 50 mg tablet Take 50 mg by mouth daily. flash glucose sensor (FreeStyle Boyd 2 Sensor) kit Use to check blood sugar daily. E11.9 1 Kit 0    loratadine-pseudoephedrine (Claritin-D 24 Hour)  mg per tablet Take 1 Tablet by mouth daily as needed (allergies). 90 Tablet 1    B-complex with vitamin C (SUPER B COMPLEX-VITAMIN C PO) Super B Complex-Vitamin C      multivit-min-ferrous fumarate (MULTI VITAMIN) 9 mg iron/15 mL liqd Multi Vitamin      levonorgestreL (MIRENA) 20 mcg/24 hours (5 yrs) 52 mg IUD Mirena 20 mcg/24 hours (5 yrs) 52 mg intrauterine device   Take by intrauterine route.        No Known Allergies

## 2023-04-21 DIAGNOSIS — J02.9 SORE THROAT: Primary | ICD-10-CM

## 2023-04-21 RX ORDER — METHYLPREDNISOLONE 8 MG/1
TABLET ORAL
Qty: 1 TABLET | Refills: 0 | Status: SHIPPED | OUTPATIENT
Start: 2023-04-21 | End: 2023-04-28

## 2023-04-24 ENCOUNTER — NURSE TRIAGE (OUTPATIENT)
Dept: OTHER | Facility: CLINIC | Age: 43
End: 2023-04-24

## 2023-04-24 NOTE — TELEPHONE ENCOUNTER
[place]    Received call from [address] at [facility] with Red Flag Complaint. Subjective: Caller states \"URI\"     Current Symptoms:   Chest congestion  Productive cough of yellow sputum  Bilateral earache  Intermittent vertigo  SOB upon exertion  Denies wheezing  Low grade fever  Sore throat- painful with swallowing    Onset: 5 days ago; worsening    Pain Severity: 7/10- ears, throat    Temperature: Denies feeling febrile    What has been tried: Amoxicillin    LMP:  IUD  Pregnant: No    Recommended disposition: Go to Office Now    Care advice provided, patient verbalizes understanding; denies any other questions or concerns; instructed to call back for any new or worsening symptoms. Patient/Caller agrees with recommended disposition; writer provided warm transfer to [place] at [facility] for appointment scheduling    Attention Provider: Thank you for allowing me to participate in the care of your patient. The patient was connected to triage in response to information provided to the ECC. Please do not respond through this encounter as the response is not directed to a shared pool.     Reason for Disposition   MILD difficulty breathing (e.g., minimal/no SOB at rest, SOB with walking, pulse <100) and still present when not coughing    Protocols used: Cough-ADULT-OH

## 2023-05-11 DIAGNOSIS — E11.9 TYPE 2 DIABETES MELLITUS WITHOUT COMPLICATIONS (HCC): ICD-10-CM

## 2023-05-11 RX ORDER — METFORMIN HYDROCHLORIDE 500 MG/1
TABLET, EXTENDED RELEASE ORAL
Qty: 30 TABLET | Refills: 0 | Status: SHIPPED | OUTPATIENT
Start: 2023-05-11

## 2023-11-18 ENCOUNTER — APPOINTMENT (OUTPATIENT)
Facility: HOSPITAL | Age: 43
End: 2023-11-18
Payer: COMMERCIAL

## 2023-11-18 ENCOUNTER — HOSPITAL ENCOUNTER (EMERGENCY)
Facility: HOSPITAL | Age: 43
Discharge: HOME | End: 2023-11-18
Attending: EMERGENCY MEDICINE
Payer: COMMERCIAL

## 2023-11-18 VITALS
SYSTOLIC BLOOD PRESSURE: 169 MMHG | HEIGHT: 66 IN | HEART RATE: 100 BPM | OXYGEN SATURATION: 100 % | RESPIRATION RATE: 18 BRPM | TEMPERATURE: 97.8 F | DIASTOLIC BLOOD PRESSURE: 93 MMHG | WEIGHT: 205.69 LBS | BODY MASS INDEX: 33.06 KG/M2

## 2023-11-18 DIAGNOSIS — M54.41 ACUTE RIGHT-SIDED LOW BACK PAIN WITH RIGHT-SIDED SCIATICA: Primary | ICD-10-CM

## 2023-11-18 PROCEDURE — 99284 EMERGENCY DEPT VISIT MOD MDM: CPT

## 2023-11-18 PROCEDURE — 93971 EXTREMITY STUDY: CPT

## 2023-11-18 PROCEDURE — 72100 X-RAY EXAM L-S SPINE 2/3 VWS: CPT

## 2023-11-18 PROCEDURE — 6360000002 HC RX W HCPCS

## 2023-11-18 PROCEDURE — 96372 THER/PROPH/DIAG INJ SC/IM: CPT

## 2023-11-18 RX ORDER — METHYLPREDNISOLONE 4 MG/1
TABLET ORAL
Qty: 1 KIT | Refills: 0 | Status: SHIPPED | OUTPATIENT
Start: 2023-11-18 | End: 2023-11-24

## 2023-11-18 RX ORDER — GLIPIZIDE 5 MG/1
5 TABLET ORAL
Qty: 60 TABLET | Refills: 0 | Status: SHIPPED | OUTPATIENT
Start: 2023-11-18 | End: 2023-12-18

## 2023-11-18 RX ORDER — KETOROLAC TROMETHAMINE 30 MG/ML
30 INJECTION, SOLUTION INTRAMUSCULAR; INTRAVENOUS
Status: COMPLETED | OUTPATIENT
Start: 2023-11-18 | End: 2023-11-18

## 2023-11-18 RX ADMIN — KETOROLAC TROMETHAMINE 30 MG: 30 INJECTION, SOLUTION INTRAMUSCULAR; INTRAVENOUS at 18:26

## 2023-11-18 ASSESSMENT — PAIN DESCRIPTION - DESCRIPTORS
DESCRIPTORS: THROBBING
DESCRIPTORS: ACHING;THROBBING

## 2023-11-18 ASSESSMENT — PAIN SCALES - GENERAL
PAINLEVEL_OUTOF10: 5
PAINLEVEL_OUTOF10: 5

## 2023-11-18 ASSESSMENT — PAIN - FUNCTIONAL ASSESSMENT: PAIN_FUNCTIONAL_ASSESSMENT: PREVENTS OR INTERFERES SOME ACTIVE ACTIVITIES AND ADLS

## 2023-11-18 ASSESSMENT — PAIN DESCRIPTION - LOCATION
LOCATION: BACK
LOCATION: HIP;KNEE

## 2023-11-18 ASSESSMENT — PAIN DESCRIPTION - ORIENTATION
ORIENTATION: RIGHT
ORIENTATION: RIGHT

## 2023-11-18 ASSESSMENT — PAIN DESCRIPTION - PAIN TYPE: TYPE: ACUTE PAIN

## 2023-11-18 ASSESSMENT — PAIN DESCRIPTION - DIRECTION: RADIATING_TOWARDS: KNEE

## 2023-11-18 NOTE — ED TRIAGE NOTES
Pt arrived ambulatory to the ER with CC leg pain radiating from hip to knee which started Tuesday. Seen at [facility] Wednesday morning. She's been taking tylenol/ ibuprofen which has not helped. Endorses some leg swelling.  Back pain    Pt denies fevers/chills, dysuria, abdominal pain, N/V    PMH: DM, BV 2 weeks ago, completed abx

## 2023-11-19 LAB — ECHO BSA: 2.08 M2

## 2024-01-17 ENCOUNTER — HOSPITAL ENCOUNTER (EMERGENCY)
Facility: HOSPITAL | Age: 44
Discharge: HOME | End: 2024-01-17
Attending: STUDENT IN AN ORGANIZED HEALTH CARE EDUCATION/TRAINING PROGRAM
Payer: COMMERCIAL

## 2024-01-17 VITALS
DIASTOLIC BLOOD PRESSURE: 88 MMHG | RESPIRATION RATE: 16 BRPM | OXYGEN SATURATION: 99 % | TEMPERATURE: 98 F | HEART RATE: 103 BPM | WEIGHT: 204.81 LBS | SYSTOLIC BLOOD PRESSURE: 158 MMHG | BODY MASS INDEX: 33.06 KG/M2

## 2024-01-17 DIAGNOSIS — H10.9 CONJUNCTIVITIS OF RIGHT EYE, UNSPECIFIED CONJUNCTIVITIS TYPE: Primary | ICD-10-CM

## 2024-01-17 PROCEDURE — 99283 EMERGENCY DEPT VISIT LOW MDM: CPT

## 2024-01-17 RX ORDER — CETIRIZINE HYDROCHLORIDE 10 MG/1
10 TABLET ORAL
Status: DISCONTINUED | OUTPATIENT
Start: 2024-01-17 | End: 2024-01-17 | Stop reason: HOSPADM

## 2024-01-17 RX ORDER — POLYMYXIN B SULFATE AND TRIMETHOPRIM 1; 10000 MG/ML; [USP'U]/ML
1 SOLUTION OPHTHALMIC EVERY 4 HOURS
Qty: 3 ML | Refills: 0 | Status: SHIPPED | OUTPATIENT
Start: 2024-01-17 | End: 2024-01-24

## 2024-01-17 RX ORDER — CETIRIZINE HYDROCHLORIDE 10 MG/1
10 TABLET ORAL DAILY
Qty: 30 TABLET | Refills: 0 | Status: SHIPPED | OUTPATIENT
Start: 2024-01-17 | End: 2024-02-16

## 2024-01-17 ASSESSMENT — ENCOUNTER SYMPTOMS
EYE PAIN: 0
SHORTNESS OF BREATH: 0
EYE DISCHARGE: 1
RHINORRHEA: 0
EYE REDNESS: 1
EYE ITCHING: 0
COUGH: 0
PHOTOPHOBIA: 0

## 2024-01-17 ASSESSMENT — VISUAL ACUITY
OS: 20/30
OU: 20/25
OD: 20/40

## 2024-01-17 NOTE — DISCHARGE INSTRUCTIONS
Please do not use any scented soaps or lotions on your skin.  Wash hands before and after touching your eye area or instilling the eyedrops.  Close follow-up with ophthalmology if no improvement.

## 2024-01-17 NOTE — ED PROVIDER NOTES
[facility] EMERGENCY DEP  EMERGENCY DEPARTMENT ENCOUNTER      Pt Name: Kimberly Lezama  MRN: [id number]  Birthdate 1980  Date of evaluation: 1/17/2024  Provider: ENEDINA Fernandez NP    CHIEF COMPLAINT       Chief Complaint   Patient presents with    Eye Drainage         HISTORY OF PRESENT ILLNESS   (Location/Symptom, Timing/Onset, Context/Setting, Quality, Duration, Modifying Factors, Severity)  Note limiting factors.   HPI patient is a 43-year-old female with past medical history significant for hidradenitis, neuropathy, anxiety, hypertension, obesity and anemia who presents to the ED with right eye drainage.  She states that she awoke this morning with crustiness and drainage consistent with pinkeye.  She does not wear contact lenses.Visual acuity within normal limits. Diffuse conjunctival injection; with watery discharge noted.  She has not any medications today prior to arrival.        Review of External Medical Records:     Nursing Notes were reviewed.    REVIEW OF SYSTEMS    (2-9 systems for level 4, 10 or more for level 5)     Review of Systems   Constitutional:  Negative for activity change and appetite change.   HENT:  Negative for congestion and rhinorrhea.    Eyes:  Positive for discharge and redness. Negative for photophobia, pain, itching and visual disturbance.   Respiratory:  Negative for cough and shortness of breath.    Cardiovascular:  Negative for chest pain, palpitations and leg swelling.   Genitourinary:  Negative for dysuria.   Neurological:  Negative for dizziness, light-headedness and headaches.   All other systems reviewed and are negative.      Except as noted above the remainder of the review of systems was reviewed and negative.       PAST MEDICAL HISTORY     Past Medical History:   Diagnosis Date    Anemia 2016    This pregnancy only    Diet controlled gestational diabetes mellitus (GDM) in third trimester 12/8/2016    Ectopic pregnancy     Epilepsy (HCC)     as infant with fever

## 2024-03-03 ENCOUNTER — HOSPITAL ENCOUNTER (EMERGENCY)
Facility: HOSPITAL | Age: 44
Discharge: HOME | End: 2024-03-03
Attending: EMERGENCY MEDICINE
Payer: COMMERCIAL

## 2024-03-03 ENCOUNTER — APPOINTMENT (OUTPATIENT)
Facility: HOSPITAL | Age: 44
End: 2024-03-03
Payer: COMMERCIAL

## 2024-03-03 VITALS
RESPIRATION RATE: 14 BRPM | DIASTOLIC BLOOD PRESSURE: 97 MMHG | WEIGHT: 205.69 LBS | TEMPERATURE: 97.6 F | OXYGEN SATURATION: 96 % | HEIGHT: 66 IN | SYSTOLIC BLOOD PRESSURE: 154 MMHG | BODY MASS INDEX: 33.06 KG/M2 | HEART RATE: 83 BPM

## 2024-03-03 DIAGNOSIS — I10 HYPERTENSION, UNSPECIFIED TYPE: ICD-10-CM

## 2024-03-03 DIAGNOSIS — G44.89 OTHER HEADACHE SYNDROME: Primary | ICD-10-CM

## 2024-03-03 LAB
ALBUMIN SERPL-MCNC: 3.6 G/DL (ref 3.5–5)
ALBUMIN/GLOB SERPL: 0.8 (ref 1.1–2.2)
ALP SERPL-CCNC: 162 U/L (ref 45–117)
ALT SERPL-CCNC: 23 U/L (ref 12–78)
ANION GAP SERPL CALC-SCNC: 4 MMOL/L (ref 5–15)
AST SERPL-CCNC: 14 U/L (ref 15–37)
BASOPHILS # BLD: 0 K/UL (ref 0–0.1)
BASOPHILS NFR BLD: 1 % (ref 0–1)
BILIRUB SERPL-MCNC: 0.4 MG/DL (ref 0.2–1)
BUN SERPL-MCNC: 11 MG/DL (ref 6–20)
BUN/CREAT SERPL: 12 (ref 12–20)
CALCIUM SERPL-MCNC: 8.9 MG/DL (ref 8.5–10.1)
CHLORIDE SERPL-SCNC: 103 MMOL/L (ref 97–108)
CO2 SERPL-SCNC: 29 MMOL/L (ref 21–32)
COMMENT:: NORMAL
COMMENT:: NORMAL
CREAT SERPL-MCNC: 0.92 MG/DL (ref 0.55–1.02)
DIFFERENTIAL METHOD BLD: ABNORMAL
EOSINOPHIL # BLD: 0.1 K/UL (ref 0–0.4)
EOSINOPHIL NFR BLD: 2 % (ref 0–7)
ERYTHROCYTE [DISTWIDTH] IN BLOOD BY AUTOMATED COUNT: 12.3 % (ref 11.5–14.5)
GLOBULIN SER CALC-MCNC: 4.6 G/DL (ref 2–4)
GLUCOSE BLD STRIP.AUTO-MCNC: 367 MG/DL (ref 65–117)
GLUCOSE SERPL-MCNC: 421 MG/DL (ref 65–100)
HCT VFR BLD AUTO: 45.6 % (ref 35–47)
HGB BLD-MCNC: 15.5 G/DL (ref 11.5–16)
IMM GRANULOCYTES # BLD AUTO: 0 K/UL (ref 0–0.04)
IMM GRANULOCYTES NFR BLD AUTO: 0 % (ref 0–0.5)
LYMPHOCYTES # BLD: 2.5 K/UL (ref 0.8–3.5)
LYMPHOCYTES NFR BLD: 45 % (ref 12–49)
MCH RBC QN AUTO: 28.9 PG (ref 26–34)
MCHC RBC AUTO-ENTMCNC: 34 G/DL (ref 30–36.5)
MCV RBC AUTO: 85.1 FL (ref 80–99)
MONOCYTES # BLD: 0.3 K/UL (ref 0–1)
MONOCYTES NFR BLD: 6 % (ref 5–13)
NEUTS SEG # BLD: 2.5 K/UL (ref 1.8–8)
NEUTS SEG NFR BLD: 46 % (ref 32–75)
NRBC # BLD: 0 K/UL (ref 0–0.01)
NRBC BLD-RTO: 0 PER 100 WBC
PLATELET # BLD AUTO: 171 K/UL (ref 150–400)
PMV BLD AUTO: 12.1 FL (ref 8.9–12.9)
POTASSIUM SERPL-SCNC: 3.8 MMOL/L (ref 3.5–5.1)
PROT SERPL-MCNC: 8.2 G/DL (ref 6.4–8.2)
RBC # BLD AUTO: 5.36 M/UL (ref 3.8–5.2)
SERVICE CMNT-IMP: ABNORMAL
SODIUM SERPL-SCNC: 136 MMOL/L (ref 136–145)
SPECIMEN HOLD: NORMAL
SPECIMEN HOLD: NORMAL
WBC # BLD AUTO: 5.5 K/UL (ref 3.6–11)

## 2024-03-03 PROCEDURE — 6360000002 HC RX W HCPCS: Performed by: NURSE PRACTITIONER

## 2024-03-03 PROCEDURE — 80053 COMPREHEN METABOLIC PANEL: CPT

## 2024-03-03 PROCEDURE — 85025 COMPLETE CBC W/AUTO DIFF WBC: CPT

## 2024-03-03 PROCEDURE — 99284 EMERGENCY DEPT VISIT MOD MDM: CPT

## 2024-03-03 PROCEDURE — 70450 CT HEAD/BRAIN W/O DYE: CPT

## 2024-03-03 PROCEDURE — 82962 GLUCOSE BLOOD TEST: CPT

## 2024-03-03 PROCEDURE — 96375 TX/PRO/DX INJ NEW DRUG ADDON: CPT

## 2024-03-03 PROCEDURE — 72125 CT NECK SPINE W/O DYE: CPT

## 2024-03-03 PROCEDURE — 2580000003 HC RX 258: Performed by: NURSE PRACTITIONER

## 2024-03-03 PROCEDURE — 36415 COLL VENOUS BLD VENIPUNCTURE: CPT

## 2024-03-03 PROCEDURE — 96374 THER/PROPH/DIAG INJ IV PUSH: CPT

## 2024-03-03 RX ORDER — 0.9 % SODIUM CHLORIDE 0.9 %
1000 INTRAVENOUS SOLUTION INTRAVENOUS ONCE
Status: COMPLETED | OUTPATIENT
Start: 2024-03-03 | End: 2024-03-03

## 2024-03-03 RX ORDER — MORPHINE SULFATE 4 MG/ML
4 INJECTION, SOLUTION INTRAMUSCULAR; INTRAVENOUS
Status: DISCONTINUED | OUTPATIENT
Start: 2024-03-03 | End: 2024-03-03

## 2024-03-03 RX ORDER — KETOROLAC TROMETHAMINE 30 MG/ML
15 INJECTION, SOLUTION INTRAMUSCULAR; INTRAVENOUS ONCE
Status: COMPLETED | OUTPATIENT
Start: 2024-03-03 | End: 2024-03-03

## 2024-03-03 RX ORDER — ONDANSETRON 2 MG/ML
4 INJECTION INTRAMUSCULAR; INTRAVENOUS ONCE
Status: COMPLETED | OUTPATIENT
Start: 2024-03-03 | End: 2024-03-03

## 2024-03-03 RX ORDER — DIPHENHYDRAMINE HYDROCHLORIDE 50 MG/ML
50 INJECTION INTRAMUSCULAR; INTRAVENOUS
Status: COMPLETED | OUTPATIENT
Start: 2024-03-03 | End: 2024-03-03

## 2024-03-03 RX ORDER — DEXAMETHASONE SODIUM PHOSPHATE 10 MG/ML
10 INJECTION, SOLUTION INTRAMUSCULAR; INTRAVENOUS ONCE
Status: COMPLETED | OUTPATIENT
Start: 2024-03-03 | End: 2024-03-03

## 2024-03-03 RX ORDER — AMLODIPINE BESYLATE 10 MG/1
10 TABLET ORAL DAILY
Qty: 30 TABLET | Refills: 0 | Status: SHIPPED | OUTPATIENT
Start: 2024-03-03

## 2024-03-03 RX ADMIN — KETOROLAC TROMETHAMINE 15 MG: 30 INJECTION, SOLUTION INTRAMUSCULAR; INTRAVENOUS at 17:16

## 2024-03-03 RX ADMIN — DEXAMETHASONE SODIUM PHOSPHATE 10 MG: 10 INJECTION INTRAMUSCULAR; INTRAVENOUS at 17:15

## 2024-03-03 RX ADMIN — ONDANSETRON 4 MG: 2 INJECTION INTRAMUSCULAR; INTRAVENOUS at 17:15

## 2024-03-03 RX ADMIN — DIPHENHYDRAMINE HYDROCHLORIDE 50 MG: 50 INJECTION INTRAMUSCULAR; INTRAVENOUS at 17:15

## 2024-03-03 RX ADMIN — SODIUM CHLORIDE 1000 ML: 9 INJECTION, SOLUTION INTRAVENOUS at 17:18

## 2024-03-03 ASSESSMENT — PAIN - FUNCTIONAL ASSESSMENT
PAIN_FUNCTIONAL_ASSESSMENT: ACTIVITIES ARE NOT PREVENTED
PAIN_FUNCTIONAL_ASSESSMENT: 0-10
PAIN_FUNCTIONAL_ASSESSMENT: ACTIVITIES ARE NOT PREVENTED

## 2024-03-03 ASSESSMENT — PAIN DESCRIPTION - PAIN TYPE: TYPE: ACUTE PAIN

## 2024-03-03 ASSESSMENT — PAIN DESCRIPTION - LOCATION
LOCATION: HEAD
LOCATION: BACK;NECK;HEAD

## 2024-03-03 ASSESSMENT — PAIN DESCRIPTION - DIRECTION: RADIATING_TOWARDS: NECK

## 2024-03-03 ASSESSMENT — PAIN DESCRIPTION - ORIENTATION: ORIENTATION: MID

## 2024-03-03 ASSESSMENT — PAIN SCALES - GENERAL
PAINLEVEL_OUTOF10: 7
PAINLEVEL_OUTOF10: 10

## 2024-03-03 ASSESSMENT — PAIN DESCRIPTION - DESCRIPTORS
DESCRIPTORS: ACHING
DESCRIPTORS: ACHING;THROBBING;JABBING

## 2024-03-03 ASSESSMENT — PAIN DESCRIPTION - FREQUENCY: FREQUENCY: CONTINUOUS

## 2024-03-03 NOTE — ED TRIAGE NOTES
Pt arrives ambulatory to ED with complaint of right sided facial droop and numbness in right hand since yesterday morning. Pt states symptoms are intermittent. Also endorses severe HA radiating to neck since yesterday.     Denies blurry vision, extremity weakness.     NP assessing during triage

## 2024-03-03 NOTE — ED PROVIDER NOTES
[facility] EMERGENCY DEP  EMERGENCY DEPARTMENT ENCOUNTER      Pt Name: Kimberly Lezama  MRN: [id number]  Birthdate 1980  Date of evaluation: 3/3/2024  Provider: ENEDINA Moses NP      HISTORY OF PRESENT ILLNESS      This is a 43 year old female who presents to the emergency room with complaints of headache, right sided facial droop and numbness in right hand since yesterday morning. Pt states symptoms are intermittent. States the headache has worsened prompting an emergency room visit.  Patient denies any speech difficulty, unilateral weakness.  Denies any chest pain, shortness of breath, dizziness, nausea or vomiting, fevers or chills.  Does have a history of migraines.  Does not take any blood thinners. Denies any chance of pregnancy. There are no further complaints at this time.    Past Medical History:  2016: Anemia      Comment:  This pregnancy only  2016: Diet controlled gestational diabetes mellitus (GDM) in   third trimester  No date: Ectopic pregnancy  No date: Epilepsy (HCC)      Comment:  as infant with fever  2016: Gestational diabetes      Comment:  per the 3 hour glucose  Past Surgical History:  2006:  DELIVERY ONLY  :  DELIVERY ONLY  No date: GYN      Comment:  left fallopian tube removal  : OTHER SURGICAL HISTORY      Comment:  lap for ectopic, L fallop. tube removed  : OTHER SURGICAL HISTORY      Comment:  surgery under L arm  : OTHER SURGICAL HISTORY      Comment:  T&A  : OTHER SURGICAL HISTORY      Comment:  oral surgery, teeth extracted  2013: OTHER SURGICAL HISTORY      Comment:  L knee surgery, torn meniscus      The history is provided by the patient.           Nursing Notes were reviewed.    REVIEW OF SYSTEMS         Review of Systems   Constitutional:  Negative for appetite change, chills, diaphoresis and fatigue.   HENT:  Negative for congestion, sinus pressure, sinus pain, sore throat and trouble swallowing.    Eyes:  Negative for

## 2024-03-04 ASSESSMENT — ENCOUNTER SYMPTOMS
SINUS PAIN: 0
SORE THROAT: 0
COUGH: 0
VOMITING: 0
SINUS PRESSURE: 0
TROUBLE SWALLOWING: 0
EYE PAIN: 0
COLOR CHANGE: 0
EYE REDNESS: 0
SHORTNESS OF BREATH: 0
ABDOMINAL PAIN: 0
NAUSEA: 0
ABDOMINAL DISTENTION: 0
PHOTOPHOBIA: 0

## 2024-03-08 ENCOUNTER — TELEPHONE (OUTPATIENT)
Age: 44
End: 2024-03-08

## 2024-03-08 NOTE — TELEPHONE ENCOUNTER
Referral received from [facility] ER to be seen for Headaches. I left a message to call us back if she'd like to schedule a new patient appointment.

## 2024-03-12 NOTE — TELEPHONE ENCOUNTER
Vm box is full, unable to leave a message regarding scheduling a new patient appointment for headaches.